# Patient Record
Sex: FEMALE | Race: OTHER | HISPANIC OR LATINO | Employment: OTHER | URBAN - METROPOLITAN AREA
[De-identification: names, ages, dates, MRNs, and addresses within clinical notes are randomized per-mention and may not be internally consistent; named-entity substitution may affect disease eponyms.]

---

## 2022-09-24 ENCOUNTER — HOSPITAL ENCOUNTER (EMERGENCY)
Facility: HOSPITAL | Age: 62
Discharge: HOME | End: 2022-09-24
Attending: EMERGENCY MEDICINE
Payer: COMMERCIAL

## 2022-09-24 VITALS
BODY MASS INDEX: 40.48 KG/M2 | TEMPERATURE: 98.7 F | WEIGHT: 220 LBS | HEIGHT: 62 IN | SYSTOLIC BLOOD PRESSURE: 146 MMHG | HEART RATE: 98 BPM | RESPIRATION RATE: 18 BRPM | DIASTOLIC BLOOD PRESSURE: 91 MMHG | OXYGEN SATURATION: 99 %
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
## 2022-09-24 DIAGNOSIS — R07.89 LEFT-SIDED CHEST WALL PAIN: ICD-10-CM

## 2022-09-24 DIAGNOSIS — M25.512 SHOULDER PAIN, LEFT: ICD-10-CM

## 2022-09-24 PROCEDURE — 99282 EMERGENCY DEPT VISIT SF MDM: CPT | Performed by: EMERGENCY MEDICINE

## 2022-09-24 PROCEDURE — 99284 EMERGENCY DEPT VISIT MOD MDM: CPT
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
## 2022-10-17 ENCOUNTER — OFFICE VISIT (OUTPATIENT)
Dept: FAMILY MEDICINE CLINIC | Facility: CLINIC | Age: 62
End: 2022-10-17
Payer: MEDICARE

## 2022-10-17 VITALS
OXYGEN SATURATION: 94 % | HEIGHT: 62 IN | BODY MASS INDEX: 42.99 KG/M2 | SYSTOLIC BLOOD PRESSURE: 132 MMHG | DIASTOLIC BLOOD PRESSURE: 80 MMHG | TEMPERATURE: 97.5 F | HEART RATE: 73 BPM | WEIGHT: 233.6 LBS

## 2022-10-17 DIAGNOSIS — Z00.00 HEALTH MAINTENANCE EXAMINATION: ICD-10-CM

## 2022-10-17 DIAGNOSIS — F41.9 ANXIETY: ICD-10-CM

## 2022-10-17 DIAGNOSIS — M17.0 PRIMARY OSTEOARTHRITIS OF BOTH KNEES: ICD-10-CM

## 2022-10-17 DIAGNOSIS — I10 PRIMARY HYPERTENSION: Primary | ICD-10-CM

## 2022-10-17 DIAGNOSIS — E78.2 MIXED HYPERLIPIDEMIA: ICD-10-CM

## 2022-10-17 DIAGNOSIS — Z12.31 ENCOUNTER FOR SCREENING MAMMOGRAM FOR MALIGNANT NEOPLASM OF BREAST: ICD-10-CM

## 2022-10-17 DIAGNOSIS — E03.9 HYPOTHYROIDISM (ACQUIRED): ICD-10-CM

## 2022-10-17 DIAGNOSIS — E66.01 OBESITY, MORBID (HCC): ICD-10-CM

## 2022-10-17 DIAGNOSIS — Z12.11 SCREENING FOR COLON CANCER: ICD-10-CM

## 2022-10-17 PROBLEM — E78.5 HYPERLIPIDEMIA: Status: ACTIVE | Noted: 2022-10-17

## 2022-10-17 PROCEDURE — 99204 OFFICE O/P NEW MOD 45 MIN: CPT | Performed by: FAMILY MEDICINE

## 2022-10-17 RX ORDER — ATENOLOL 25 MG/1
25 TABLET ORAL DAILY
COMMUNITY
Start: 2022-08-22 | End: 2022-10-17 | Stop reason: SDUPTHER

## 2022-10-17 RX ORDER — CLONAZEPAM 0.5 MG/1
0.5 TABLET ORAL
Qty: 90 TABLET | Refills: 0 | Status: SHIPPED | OUTPATIENT
Start: 2022-10-17

## 2022-10-17 RX ORDER — LEVOTHYROXINE SODIUM 112 UG/1
112 TABLET ORAL DAILY
Qty: 90 TABLET | Refills: 3 | Status: SHIPPED | OUTPATIENT
Start: 2022-10-17

## 2022-10-17 RX ORDER — ATENOLOL 25 MG/1
25 TABLET ORAL DAILY
Qty: 90 TABLET | Refills: 3 | Status: SHIPPED | OUTPATIENT
Start: 2022-10-17

## 2022-10-17 RX ORDER — SIMVASTATIN 20 MG
20 TABLET ORAL DAILY
Qty: 90 TABLET | Refills: 3 | Status: SHIPPED | OUTPATIENT
Start: 2022-10-17

## 2022-10-17 RX ORDER — LEVOTHYROXINE SODIUM 112 UG/1
112 TABLET ORAL DAILY
COMMUNITY
Start: 2022-08-26 | End: 2022-10-17 | Stop reason: SDUPTHER

## 2022-10-17 RX ORDER — SIMVASTATIN 20 MG
20 TABLET ORAL DAILY
COMMUNITY
Start: 2022-08-27 | End: 2022-10-17 | Stop reason: SDUPTHER

## 2022-10-17 RX ORDER — CLONAZEPAM 0.5 MG/1
0.5 TABLET ORAL DAILY
COMMUNITY
Start: 2022-08-27 | End: 2022-10-17 | Stop reason: SDUPTHER
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
## 2022-10-17 NOTE — PATIENT INSTRUCTIONS
Blood pressure appears to be okay  Will check chemistry, lipid profile, and thyroid  Refills given on current medications  Suggest getting the COVID vaccine  Declines flu shot  X-ray both knees  Will be seeing Gynecology  Prescription given for mammogram   She opts for Cologuard  Suggest the shingles vaccine at local drugstore as she is on Medicare  Recheck 6 months

## 2023-01-24 DIAGNOSIS — Z12.31 ENCOUNTER FOR SCREENING MAMMOGRAM FOR MALIGNANT NEOPLASM OF BREAST: ICD-10-CM

## 2023-04-03 ENCOUNTER — OFFICE VISIT (OUTPATIENT)
Dept: FAMILY MEDICINE CLINIC | Facility: CLINIC | Age: 63
End: 2023-04-03

## 2023-04-03 VITALS
SYSTOLIC BLOOD PRESSURE: 132 MMHG | BODY MASS INDEX: 43.06 KG/M2 | HEART RATE: 75 BPM | WEIGHT: 234 LBS | OXYGEN SATURATION: 92 % | DIASTOLIC BLOOD PRESSURE: 78 MMHG | TEMPERATURE: 97 F | HEIGHT: 62 IN

## 2023-04-03 DIAGNOSIS — F41.9 ANXIETY: ICD-10-CM

## 2023-04-03 DIAGNOSIS — K21.9 GASTROESOPHAGEAL REFLUX DISEASE WITHOUT ESOPHAGITIS: Primary | ICD-10-CM

## 2023-04-03 DIAGNOSIS — I10 PRIMARY HYPERTENSION: ICD-10-CM

## 2023-04-03 DIAGNOSIS — E66.01 OBESITY, MORBID (HCC): ICD-10-CM

## 2023-04-03 RX ORDER — CLONAZEPAM 0.5 MG/1
0.5 TABLET ORAL
Qty: 90 TABLET | Refills: 0 | Status: SHIPPED | OUTPATIENT
Start: 2023-04-03

## 2023-04-03 RX ORDER — FAMOTIDINE 40 MG/1
40 TABLET, FILM COATED ORAL DAILY
Qty: 90 TABLET | Refills: 3 | Status: SHIPPED | OUTPATIENT
Start: 2023-04-03

## 2023-04-03 NOTE — PATIENT INSTRUCTIONS
Suspect acid reflux causing her symptoms  Trial of famotidine 40 mg once daily to suppress acid  Okay to use 3 or 4 Tums as needed for symptoms  Continue atenolol for blood pressure  Refill given for clonazepam 0 5 mg to use at bedtime  Continue simvastatin and thyroid replacement  Return as scheduled for wellness exam and follow-up of stomach symptoms
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
## 2023-04-27 ENCOUNTER — OFFICE VISIT (OUTPATIENT)
Dept: FAMILY MEDICINE CLINIC | Facility: CLINIC | Age: 63
End: 2023-04-27

## 2023-04-27 VITALS
TEMPERATURE: 97.6 F | HEIGHT: 62 IN | HEART RATE: 61 BPM | SYSTOLIC BLOOD PRESSURE: 130 MMHG | OXYGEN SATURATION: 97 % | BODY MASS INDEX: 43.24 KG/M2 | DIASTOLIC BLOOD PRESSURE: 82 MMHG | WEIGHT: 235 LBS

## 2023-04-27 DIAGNOSIS — E66.01 OBESITY, MORBID (HCC): ICD-10-CM

## 2023-04-27 DIAGNOSIS — E78.2 MIXED HYPERLIPIDEMIA: ICD-10-CM

## 2023-04-27 DIAGNOSIS — E03.9 HYPOTHYROIDISM (ACQUIRED): ICD-10-CM

## 2023-04-27 DIAGNOSIS — K21.9 GASTROESOPHAGEAL REFLUX DISEASE WITHOUT ESOPHAGITIS: ICD-10-CM

## 2023-04-27 DIAGNOSIS — M17.0 PRIMARY OSTEOARTHRITIS OF BOTH KNEES: ICD-10-CM

## 2023-04-27 DIAGNOSIS — I10 PRIMARY HYPERTENSION: Primary | ICD-10-CM

## 2023-04-27 DIAGNOSIS — Z00.00 MEDICARE ANNUAL WELLNESS VISIT, SUBSEQUENT: ICD-10-CM

## 2023-04-27 NOTE — PATIENT INSTRUCTIONS
Medicare wellness exam is completed  Blood pressure and cholesterol are well controlled  Last lipid profile was excellent  Same dose of thyroid replacement  Probably a good candidate for a knee replacement although she will get Synvisc in the near future  Suggest trial of famotidine that she was given last visit for her stomach symptoms  Works better than aloe  Recommend getting COVID booster at local drugstore  Recheck in 6 months  Medicare Preventive Visit Patient Instructions  Thank you for completing your Welcome to Medicare Visit or Medicare Annual Wellness Visit today  Your next wellness visit will be due in one year (4/27/2024)  The screening/preventive services that you may require over the next 5-10 years are detailed below  Some tests may not apply to you based off risk factors and/or age  Screening tests ordered at today's visit but not completed yet may show as past due  Also, please note that scanned in results may not display below  Preventive Screenings:  Service Recommendations Previous Testing/Comments   Colorectal Cancer Screening  * Colonoscopy    * Fecal Occult Blood Test (FOBT)/Fecal Immunochemical Test (FIT)  * Fecal DNA/Cologuard Test  * Flexible Sigmoidoscopy Age: 39-70 years old   Colonoscopy: every 10 years (may be performed more frequently if at higher risk)  OR  FOBT/FIT: every 1 year  OR  Cologuard: every 3 years  OR  Sigmoidoscopy: every 5 years  Screening may be recommended earlier than age 39 if at higher risk for colorectal cancer  Also, an individualized decision between you and your healthcare provider will decide whether screening between the ages of 74-80 would be appropriate   Colonoscopy: Not on file  FOBT/FIT: Not on file  Cologuard: Not on file  Sigmoidoscopy: Not on file          Breast Cancer Screening Age: 36 years old  Frequency: every 1-2 years  Not required if history of left and right mastectomy Mammogram: 01/12/2023        Cervical Cancer Screening Between the ages of 21-29, pap smear recommended once every 3 years  Between the ages of 33-67, can perform pap smear with HPV co-testing every 5 years  Recommendations may differ for women with a history of total hysterectomy, cervical cancer, or abnormal pap smears in past  Pap Smear: Not on file        Hepatitis C Screening Once for adults born between 1945 and 1965  More frequently in patients at high risk for Hepatitis C Hep C Antibody: Not on file        Diabetes Screening 1-2 times per year if you're at risk for diabetes or have pre-diabetes Fasting glucose: No results in last 5 years (No results in last 5 years)  A1C: No results in last 5 years (No results in last 5 years)      Cholesterol Screening Once every 5 years if you don't have a lipid disorder  May order more often based on risk factors  Lipid panel: Not on file          Other Preventive Screenings Covered by Medicare:  1  Abdominal Aortic Aneurysm (AAA) Screening: covered once if your at risk  You're considered to be at risk if you have a family history of AAA  2  Lung Cancer Screening: covers low dose CT scan once per year if you meet all of the following conditions: (1) Age 50-69; (2) No signs or symptoms of lung cancer; (3) Current smoker or have quit smoking within the last 15 years; (4) You have a tobacco smoking history of at least 20 pack years (packs per day multiplied by number of years you smoked); (5) You get a written order from a healthcare provider  3  Glaucoma Screening: covered annually if you're considered high risk: (1) You have diabetes OR (2) Family history of glaucoma OR (3)  aged 48 and older OR (3)  American aged 72 and older  3   Osteoporosis Screening: covered every 2 years if you meet one of the following conditions: (1) You're estrogen deficient and at risk for osteoporosis based off medical history and other findings; (2) Have a vertebral abnormality; (3) On glucocorticoid therapy for more than 3 months; (4) Have primary hyperparathyroidism; (5) On osteoporosis medications and need to assess response to drug therapy  · Last bone density test (DXA Scan): Not on file  5  HIV Screening: covered annually if you're between the age of 12-76  Also covered annually if you are younger than 13 and older than 72 with risk factors for HIV infection  For pregnant patients, it is covered up to 3 times per pregnancy  Immunizations:  Immunization Recommendations   Influenza Vaccine Annual influenza vaccination during flu season is recommended for all persons aged >= 6 months who do not have contraindications   Pneumococcal Vaccine   * Pneumococcal conjugate vaccine = PCV13 (Prevnar 13), PCV15 (Vaxneuvance), PCV20 (Prevnar 20)  * Pneumococcal polysaccharide vaccine = PPSV23 (Pneumovax) Adults 25-60 years old: 1-3 doses may be recommended based on certain risk factors  Adults 72 years old: 1-2 doses may be recommended based off what pneumonia vaccine you previously received   Hepatitis B Vaccine 3 dose series if at intermediate or high risk (ex: diabetes, end stage renal disease, liver disease)   Tetanus (Td) Vaccine - COST NOT COVERED BY MEDICARE PART B Following completion of primary series, a booster dose should be given every 10 years to maintain immunity against tetanus  Td may also be given as tetanus wound prophylaxis  Tdap Vaccine - COST NOT COVERED BY MEDICARE PART B Recommended at least once for all adults  For pregnant patients, recommended with each pregnancy     Shingles Vaccine (Shingrix) - COST NOT COVERED BY MEDICARE PART B  2 shot series recommended in those aged 48 and above     Health Maintenance Due:      Topic Date Due   • Hepatitis C Screening  Never done   • HIV Screening  Never done   • Cervical Cancer Screening  Never done   • Colorectal Cancer Screening  Never done   • Breast Cancer Screening: Mammogram  01/12/2024     Immunizations Due:      Topic Date Due   • COVID-19 Vaccine (3 - Booster for Moderna series) 06/04/2021     Advance Directives   What are advance directives? Advance directives are legal documents that state your wishes and plans for medical care  These plans are made ahead of time in case you lose your ability to make decisions for yourself  Advance directives can apply to any medical decision, such as the treatments you want, and if you want to donate organs  What are the types of advance directives? There are many types of advance directives, and each state has rules about how to use them  You may choose a combination of any of the following:  · Living will: This is a written record of the treatment you want  You can also choose which treatments you do not want, which to limit, and which to stop at a certain time  This includes surgery, medicine, IV fluid, and tube feedings  · Durable power of  for healthcare [facility]): This is a written record that states who you want to make healthcare choices for you when you are unable to make them for yourself  This person, called a proxy, is usually a family member or a friend  You may choose more than 1 proxy  · Do not resuscitate (DNR) order:  A DNR order is used in case your heart stops beating or you stop breathing  It is a request not to have certain forms of treatment, such as CPR  A DNR order may be included in other types of advance directives  · Medical directive: This covers the care that you want if you are in a coma, near death, or unable to make decisions for yourself  You can list the treatments you want for each condition  Treatment may include pain medicine, surgery, blood transfusions, dialysis, IV or tube feedings, and a ventilator (breathing machine)  · Values history: This document has questions about your views, beliefs, and how you feel and think about life  This information can help others choose the care that you would choose  Why are advance directives important?   An advance directive helps you control your care  Although spoken wishes may be used, it is better to have your wishes written down  Spoken wishes can be misunderstood, or not followed  Treatments may be given even if you do not want them  An advance directive may make it easier for your family to make difficult choices about your care  Weight Management   Why it is important to manage your weight:  Being overweight increases your risk of health conditions such as heart disease, high blood pressure, type 2 diabetes, and certain types of cancer  It can also increase your risk for osteoarthritis, sleep apnea, and other respiratory problems  Aim for a slow, steady weight loss  Even a small amount of weight loss can lower your risk of health problems  How to lose weight safely:  A safe and healthy way to lose weight is to eat fewer calories and get regular exercise  You can lose up about 1 pound a week by decreasing the number of calories you eat by 500 calories each day  Healthy meal plan for weight management:  A healthy meal plan includes a variety of foods, contains fewer calories, and helps you stay healthy  A healthy meal plan includes the following:  · Eat whole-grain foods more often  A healthy meal plan should contain fiber  Fiber is the part of grains, fruits, and vegetables that is not broken down by your body  Whole-grain foods are healthy and provide extra fiber in your diet  Some examples of whole-grain foods are whole-wheat breads and pastas, oatmeal, brown rice, and bulgur  · Eat a variety of vegetables every day  Include dark, leafy greens such as spinach, kale, [place] greens, and mustard greens  Eat yellow and orange vegetables such as carrots, sweet potatoes, and winter squash  · Eat a variety of fruits every day  Choose fresh or canned fruit (canned in its own juice or light syrup) instead of juice  Fruit juice has very little or no fiber  · Eat low-fat dairy foods  Drink fat-free (skim) milk or 1% milk   Eat fat-free yogurt and low-fat cottage cheese  Try low-fat cheeses such as mozzarella and other reduced-fat cheeses  · Choose meat and other protein foods that are low in fat  Choose beans or other legumes such as split peas or lentils  Choose fish, skinless poultry (chicken or turkey), or lean cuts of red meat (beef or pork)  Before you cook meat or poultry, cut off any visible fat  · Use less fat and oil  Try baking foods instead of frying them  Add less fat, such as margarine, sour cream, regular salad dressing and mayonnaise to foods  Eat fewer high-fat foods  Some examples of high-fat foods include french fries, doughnuts, ice cream, and cakes  · Eat fewer sweets  Limit foods and drinks that are high in sugar  This includes candy, cookies, regular soda, and sweetened drinks  Exercise:  Exercise at least 30 minutes per day on most days of the week  Some examples of exercise include walking, biking, dancing, and swimming  You can also fit in more physical activity by taking the stairs instead of the elevator or parking farther away from stores  Ask your healthcare provider about the best exercise plan for you  © Copyright [employer] 2018 Information is for End User's use only and may not be sold, redistributed or otherwise used for commercial purposes   All illustrations and images included in CareNotes® are the copyrighted property of A D A M , Inc  or [address] [place] [address] [employer]
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
## 2023-07-03 ENCOUNTER — OFFICE VISIT (OUTPATIENT)
Dept: FAMILY MEDICINE CLINIC | Facility: CLINIC | Age: 63
End: 2023-07-03
Payer: MEDICARE

## 2023-07-03 VITALS
HEIGHT: 62 IN | TEMPERATURE: 97.1 F | BODY MASS INDEX: 43.58 KG/M2 | HEART RATE: 74 BPM | WEIGHT: 236.8 LBS | OXYGEN SATURATION: 99 % | SYSTOLIC BLOOD PRESSURE: 130 MMHG | DIASTOLIC BLOOD PRESSURE: 82 MMHG

## 2023-07-03 DIAGNOSIS — M54.50 CHRONIC MIDLINE LOW BACK PAIN WITHOUT SCIATICA: ICD-10-CM

## 2023-07-03 DIAGNOSIS — G89.29 CHRONIC MIDLINE LOW BACK PAIN WITHOUT SCIATICA: ICD-10-CM

## 2023-07-03 DIAGNOSIS — N30.00 ACUTE CYSTITIS WITHOUT HEMATURIA: Primary | ICD-10-CM

## 2023-07-03 LAB
SL AMB  POCT GLUCOSE, UA: NEGATIVE
SL AMB LEUKOCYTE ESTERASE,UA: ABNORMAL
SL AMB POCT BILIRUBIN,UA: NEGATIVE
SL AMB POCT BLOOD,UA: ABNORMAL
SL AMB POCT CLARITY,UA: ABNORMAL
SL AMB POCT COLOR,UA: ABNORMAL
SL AMB POCT KETONES,UA: NEGATIVE
SL AMB POCT NITRITE,UA: NEGATIVE
SL AMB POCT PH,UA: 5
SL AMB POCT SPECIFIC GRAVITY,UA: 1
SL AMB POCT URINE PROTEIN: ABNORMAL
SL AMB POCT UROBILINOGEN: 0.2

## 2023-07-03 PROCEDURE — 87086 URINE CULTURE/COLONY COUNT: CPT | Performed by: FAMILY MEDICINE

## 2023-07-03 PROCEDURE — 99213 OFFICE O/P EST LOW 20 MIN: CPT | Performed by: FAMILY MEDICINE

## 2023-07-03 PROCEDURE — 81002 URINALYSIS NONAUTO W/O SCOPE: CPT | Performed by: FAMILY MEDICINE

## 2023-07-03 RX ORDER — SULFAMETHOXAZOLE AND TRIMETHOPRIM 800; 160 MG/1; MG/1
1 TABLET ORAL EVERY 12 HOURS SCHEDULED
Qty: 6 TABLET | Refills: 0 | Status: SHIPPED | OUTPATIENT
Start: 2023-07-03 | End: 2023-07-06

## 2023-07-03 NOTE — PATIENT INSTRUCTIONS
Treat with Bactrim DS twice daily for 3 days. Will obtain urine culture because of a history of having had a UTI a year ago and the fact that her present dipstick test shows trace pus cells although moderate blood. Suggest ibuprofen up to 4 capsules or tablets 3 times a day as needed for back pain. Return as scheduled.
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
## 2023-07-04 LAB — BACTERIA UR CULT: NORMAL

## 2023-08-03 ENCOUNTER — OFFICE VISIT (OUTPATIENT)
Dept: FAMILY MEDICINE CLINIC | Facility: CLINIC | Age: 63
End: 2023-08-03
Payer: MEDICARE

## 2023-08-03 VITALS
HEIGHT: 62 IN | TEMPERATURE: 97.8 F | WEIGHT: 242.6 LBS | OXYGEN SATURATION: 99 % | BODY MASS INDEX: 44.64 KG/M2 | SYSTOLIC BLOOD PRESSURE: 124 MMHG | DIASTOLIC BLOOD PRESSURE: 80 MMHG | RESPIRATION RATE: 16 BRPM | HEART RATE: 68 BPM

## 2023-08-03 DIAGNOSIS — M17.0 PRIMARY OSTEOARTHRITIS OF BOTH KNEES: ICD-10-CM

## 2023-08-03 DIAGNOSIS — R39.9 UTI SYMPTOMS: ICD-10-CM

## 2023-08-03 DIAGNOSIS — E66.01 OBESITY, MORBID (HCC): ICD-10-CM

## 2023-08-03 DIAGNOSIS — M47.816 ARTHRITIS OF LUMBAR SPINE: Primary | ICD-10-CM

## 2023-08-03 LAB
SL AMB  POCT GLUCOSE, UA: NORMAL
SL AMB LEUKOCYTE ESTERASE,UA: NORMAL
SL AMB POCT BILIRUBIN,UA: NORMAL
SL AMB POCT BLOOD,UA: NORMAL
SL AMB POCT CLARITY,UA: CLEAR
SL AMB POCT COLOR,UA: YELLOW
SL AMB POCT KETONES,UA: NORMAL
SL AMB POCT NITRITE,UA: NORMAL
SL AMB POCT PH,UA: 6
SL AMB POCT SPECIFIC GRAVITY,UA: 1.02
SL AMB POCT URINE PROTEIN: NORMAL
SL AMB POCT UROBILINOGEN: NORMAL

## 2023-08-03 PROCEDURE — 81002 URINALYSIS NONAUTO W/O SCOPE: CPT | Performed by: FAMILY MEDICINE

## 2023-08-03 PROCEDURE — 99214 OFFICE O/P EST MOD 30 MIN: CPT | Performed by: FAMILY MEDICINE

## 2023-08-03 RX ORDER — MELOXICAM 15 MG/1
15 TABLET ORAL DAILY
Qty: 90 TABLET | Refills: 3 | Status: SHIPPED | OUTPATIENT
Start: 2023-08-03

## 2023-08-03 RX ORDER — PHENTERMINE HYDROCHLORIDE 37.5 MG/1
37.5 TABLET ORAL DAILY
Qty: 30 TABLET | Refills: 0 | Status: SHIPPED | OUTPATIENT
Start: 2023-08-03
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
## 2023-08-03 NOTE — PATIENT INSTRUCTIONS
Discomfort coming from her back. Bladder is okay. Trial of meloxicam 15 mg once daily along with 2 extra strength Tylenol 3 times a day. X-ray lumbar spine. Has gained 22 pounds in the last year. Also would benefit from knee replacements. Weight loss would help. Trial of phentermine 37.5 mg once daily in the morning. Recheck in 1 month.

## 2023-08-28 DIAGNOSIS — F41.9 ANXIETY: ICD-10-CM

## 2023-08-28 RX ORDER — CLONAZEPAM 0.5 MG/1
0.5 TABLET ORAL
Qty: 90 TABLET | Refills: 0 | Status: SHIPPED | OUTPATIENT
Start: 2023-08-28

## 2023-09-19 ENCOUNTER — TELEMEDICINE (OUTPATIENT)
Dept: FAMILY MEDICINE CLINIC | Facility: CLINIC | Age: 63
End: 2023-09-19
Payer: MEDICARE

## 2023-09-19 VITALS — HEIGHT: 62 IN | TEMPERATURE: 98.4 F | BODY MASS INDEX: 44.53 KG/M2 | WEIGHT: 242 LBS

## 2023-09-19 DIAGNOSIS — U07.1 COVID-19 VIRUS INFECTION: Primary | ICD-10-CM

## 2023-09-19 PROCEDURE — 99213 OFFICE O/P EST LOW 20 MIN: CPT | Performed by: FAMILY MEDICINE

## 2023-09-19 RX ORDER — ONDANSETRON 4 MG/1
4 TABLET, FILM COATED ORAL EVERY 6 HOURS PRN
Qty: 20 TABLET | Refills: 1 | Status: SHIPPED | OUTPATIENT
Start: 2023-09-19

## 2023-09-19 RX ORDER — NIRMATRELVIR AND RITONAVIR 300-100 MG
3 KIT ORAL 2 TIMES DAILY
Qty: 30 TABLET | Refills: 0 | Status: SHIPPED | OUTPATIENT
Start: 2023-09-19 | End: 2023-09-24

## 2023-09-19 NOTE — PROGRESS NOTES
COVID-19 Outpatient Progress Note    Assessment/Plan:    Problem List Items Addressed This Visit    None  Visit Diagnoses     COVID-19 virus infection    -  Primary         Disposition:     Paxlovid 3 tablets twice daily for 5 days. Do not take simvastatin while on Paxlovid. Suggest 2 extra strength Tylenol 3 times a day. Ondansetron 4 mg every 6 hours if needed for nausea. Should be quarantine for total of 5 days. Today would be day 2. After the first 5 days, should wear a mask when she goes out for the next 5 days. Follow-up as needed. I have spent a total time of 8 minutes on the day of the encounter for this patient including       Encounter provider: Luke Malcolm MD     Provider located at: [address] [place] [id number]     Recent Visits  No visits were found meeting these conditions. Showing recent visits within past 7 days and meeting all other requirements  Today's Visits  Date Type Provider Dept   09/19/23 Telemedicine Luke Malcolm MD [facility] today's visits and meeting all other requirements  Future Appointments  No visits were found meeting these conditions. Showing future appointments within next 150 days and meeting all other requirements     This virtual check-in was done via [facility] and patient was informed that this is a secure, HIPAA-compliant platform. She agrees to proceed. Patient agrees to participate in a virtual check in via telephone or video visit instead of presenting to the office to address urgent/immediate medical needs. Patient is aware this is a billable service. She acknowledged consent and understanding of privacy and security of the video platform. The patient has agreed to participate and understands they can discontinue the visit at any time. After connecting through [facility], the patient was identified by name and date of birth.  Eric Bledsoe was informed that this was a telemedicine visit and that the exam was being conducted confidentially over secure lines. My office door was closed. No one else was in the room. Onur Varghese acknowledged consent and understanding of privacy and security of the telemedicine visit. I informed the patient that I have reviewed her record in Epic and presented the opportunity for her to ask any questions regarding the visit today. The patient agreed to participate. Verification of patient location:  Patient is located in the following state in which I hold an active license: [place]    Subjective:   Onur Varghese is a 57 y.o. female who is concerned about COVID-19. Patient's symptoms include nasal congestion, cough, nausea, vomiting and headache. Patient denies fever and shortness of breath. - Date of symptom onset: 9/18/2023      COVID-19 vaccination status: Fully vaccinated (primary series)    Tested positive today. 20-year-old mother also positive. No results found for: "SARSCOV2", "[facility]", "[address] [place] [address],[address]", "CORONAVIRUSR", "[address] [place] [address]", "[address]"    Review of Systems   Constitutional: Negative for fever. HENT: Positive for congestion. Respiratory: Positive for cough. Negative for shortness of breath. Gastrointestinal: Positive for nausea and vomiting. Neurological: Positive for headaches.      Current Outpatient Medications on File Prior to Visit   Medication Sig   • atenolol (TENORMIN) 25 mg tablet Take 1 tablet (25 mg total) by mouth daily   • clonazePAM (KlonoPIN) 0.5 mg tablet TAKE 1 TABLET (0.5 MG TOTAL) BY MOUTH DAILY AT BEDTIME AS NEEDED (SLEEP)   • famotidine (PEPCID) 40 MG tablet Take 1 tablet (40 mg total) by mouth daily   • levothyroxine 112 mcg tablet Take 1 tablet (112 mcg total) by mouth daily   • meloxicam (MOBIC) 15 mg tablet Take 1 tablet (15 mg total) by mouth daily   • phentermine (ADIPEX-P) 37.5 MG tablet Take 1 tablet (37.5 mg total) by mouth daily   • simvastatin (ZOCOR) 20 mg tablet Take 1 tablet (20 mg total) by mouth daily       Objective:    Temp 98.4 °F (36.9 °C) (Temporal)   Ht 5' 2" (1.575 m)   Wt 110 kg (242 lb)   BMI 44.26 kg/m²        Physical Exam   Alert. No cough or shortness of breath noted.   Sumanth Connors MD

## 2023-09-19 NOTE — PATIENT INSTRUCTIONS
Paxlovid 3 tablets twice daily for 5 days. Do not take simvastatin for the 5 days that you take Paxlovid. Suggest 2 extra strength Tylenol 3 times a day. Ondansetron 4 mg every 6 hours if needed for nausea. Should be quarantine for total of 5 days. Today would be day 2. After the first 5 days, should wear a mask when she goes out for the next 5 days. Follow-up as needed.

## 2023-10-18 ENCOUNTER — RA CDI HCC (OUTPATIENT)
Dept: OTHER | Facility: HOSPITAL | Age: 63
End: 2023-10-18

## 2023-11-10 DIAGNOSIS — I10 PRIMARY HYPERTENSION: ICD-10-CM

## 2023-11-10 RX ORDER — ATENOLOL 25 MG/1
25 TABLET ORAL DAILY
Qty: 90 TABLET | Refills: 3 | Status: SHIPPED | OUTPATIENT
Start: 2023-11-10

## 2023-12-03 DIAGNOSIS — E03.9 HYPOTHYROIDISM (ACQUIRED): ICD-10-CM

## 2023-12-04 RX ORDER — LEVOTHYROXINE SODIUM 112 UG/1
112 TABLET ORAL DAILY
Qty: 90 TABLET | Refills: 3 | Status: SHIPPED | OUTPATIENT
Start: 2023-12-04

## 2024-03-11 DIAGNOSIS — F41.9 ANXIETY: ICD-10-CM

## 2024-03-11 NOTE — TELEPHONE ENCOUNTER
Reason for call:   [x] Refill   [] Prior Auth  [] Other:     Office:   [x] PCP/Provider - [facility]  [] Specialty/Provider -     Medication:     Pharmacy:     Does the patient have enough for 3 days?   [x] Yes   [] No - Send as HP to POD

## 2024-03-12 DIAGNOSIS — E78.2 MIXED HYPERLIPIDEMIA: ICD-10-CM

## 2024-03-12 RX ORDER — SIMVASTATIN 20 MG
20 TABLET ORAL DAILY
Qty: 90 TABLET | Refills: 1 | Status: SHIPPED | OUTPATIENT
Start: 2024-03-12

## 2024-03-12 RX ORDER — CLONAZEPAM 0.5 MG/1
0.5 TABLET ORAL
Qty: 90 TABLET | Refills: 0 | Status: SHIPPED | OUTPATIENT
Start: 2024-03-12

## 2024-04-16 ENCOUNTER — TELEMEDICINE (OUTPATIENT)
Dept: FAMILY MEDICINE CLINIC | Facility: CLINIC | Age: 64
End: 2024-04-16
Payer: MEDICARE

## 2024-04-16 ENCOUNTER — TELEPHONE (OUTPATIENT)
Age: 64
End: 2024-04-16

## 2024-04-16 VITALS — WEIGHT: 230 LBS | OXYGEN SATURATION: 98 % | HEIGHT: 62 IN | BODY MASS INDEX: 42.33 KG/M2

## 2024-04-16 DIAGNOSIS — B34.9 VIRAL SYNDROME: Primary | ICD-10-CM

## 2024-04-16 PROCEDURE — G2211 COMPLEX E/M VISIT ADD ON: HCPCS | Performed by: FAMILY MEDICINE

## 2024-04-16 PROCEDURE — 99213 OFFICE O/P EST LOW 20 MIN: CPT | Performed by: FAMILY MEDICINE

## 2024-04-16 RX ORDER — BENZONATATE 200 MG/1
200 CAPSULE ORAL 3 TIMES DAILY PRN
Qty: 30 CAPSULE | Refills: 1 | Status: SHIPPED | OUTPATIENT
Start: 2024-04-16

## 2024-04-16 NOTE — TELEPHONE ENCOUNTER
Patient called in inquiring about her virtual visit. Patient want to speak with Rashid. Warm transferred to Rashid.

## 2024-04-16 NOTE — PROGRESS NOTES
Virtual Regular Visit    Verification of patient location:    Patient is located at Home in the following state in which I hold an active license [place]      Assessment/Plan:    Problem List Items Addressed This Visit    None  Visit Diagnoses       Viral syndrome    -  Primary    Relevant Medications    benzonatate (TESSALON) 200 MG capsule          Patient Instructions   Continue Tylenol 3 times a day.  Honey is good for cough.  Cough drops are helpful.  Benzonatate 200 mg 3 times a day for nonnarcotic cough relief.  Antibiotic would not be helpful.  Could use Sudafed if needed for runny nose.  Claritin is good for allergies.           Reason for visit is   Chief Complaint   Patient presents with    URI     Dry cough, congestion started last week    Virtual Regular Visit          Encounter provider Oscar Cooley MD    Provider located at [address] [place] [id number]      Recent Visits  No visits were found meeting these conditions.  Showing recent visits within past 7 days and meeting all other requirements  Today's Visits  Date Type Provider Dept   04/16/24 Telemedicine Oscar Cooley MD [facility]   Showing today's visits and meeting all other requirements  Future Appointments  No visits were found meeting these conditions.  Showing future appointments within next 150 days and meeting all other requirements       The patient was identified by name and date of birth. Susanwalter Turnerado was informed that this is a telemedicine visit and that the visit is being conducted through the Epic Embedded platform. She agrees to proceed..  My office door was closed. No one else was in the room.  She acknowledged consent and understanding of privacy and security of the video platform. The patient has agreed to participate and understands they can discontinue the visit at any time.    Patient is aware this is a billable service.     Subjective  Susan Oliva  is a 63 y.o. female who started with a scratchy throat about 10 days ago.  Then developed cough and congestion.  Today, had some sneezing.  No fever.  Taking Tylenol.  Drinking tea with honey and lemon.  Has a little bit of phlegm.  Cough is bothersome at night.  .           Past Medical History:   Diagnosis Date    Anxiety     Arthritis of lumbar spine 8/3/2023    Gastroesophageal reflux disease without esophagitis 4/3/2023    Hyperlipidemia     Hypertension     Hypothyroidism (acquired)     Osteoarthritis of both knees 10/17/2022       Past Surgical History:   Procedure Laterality Date    CHOLECYSTECTOMY  1986    open    TONSILLECTOMY      TUBAL LIGATION         Current Outpatient Medications   Medication Sig Dispense Refill    atenolol (TENORMIN) 25 mg tablet TAKE 1 TABLET (25 MG TOTAL) BY MOUTH DAILY. 90 tablet 3    clonazePAM (KlonoPIN) 0.5 mg tablet Take 1 tablet (0.5 mg total) by mouth daily at bedtime as needed (Sleep) 90 tablet 0    levothyroxine 112 mcg tablet TAKE 1 TABLET BY MOUTH EVERY DAY 90 tablet 3    simvastatin (ZOCOR) 20 mg tablet TAKE 1 TABLET BY MOUTH EVERY DAY 90 tablet 1    famotidine (PEPCID) 40 MG tablet Take 1 tablet (40 mg total) by mouth daily (Patient not taking: Reported on 4/16/2024) 90 tablet 3    meloxicam (MOBIC) 15 mg tablet Take 1 tablet (15 mg total) by mouth daily (Patient not taking: Reported on 4/16/2024) 90 tablet 3    ondansetron (ZOFRAN) 4 mg tablet Take 1 tablet (4 mg total) by mouth every 6 (six) hours as needed for nausea or vomiting (Patient not taking: Reported on 4/16/2024) 20 tablet 1    phentermine (ADIPEX-P) 37.5 MG tablet Take 1 tablet (37.5 mg total) by mouth daily (Patient not taking: Reported on 4/16/2024) 30 tablet 0     No current facility-administered medications for this visit.        Allergies   Allergen Reactions    Prednisone Dermatitis       Red cheeks    Percocet [Oxycodone-Acetaminophen] Palpitations       Review of Systems    Video Exam    Vitals:     "04/16/24 1511   SpO2: 98%   Weight: 104 kg (230 lb)   Height: 5' 2\" (1.575 m)       Physical Exam   Breathing comfortably.  No cough noted.  Visit Time  Total Visit Duration: 6        "

## 2024-04-16 NOTE — PATIENT INSTRUCTIONS
Continue Tylenol 3 times a day.  Honey is good for cough.  Cough drops are helpful.  Benzonatate 200 mg 3 times a day for nonnarcotic cough relief.  Antibiotic would not be helpful.  Could use Sudafed if needed for runny nose.  Claritin is good for allergies.

## 2024-06-17 ENCOUNTER — RA CDI HCC (OUTPATIENT)
Dept: OTHER | Facility: HOSPITAL | Age: 64
End: 2024-06-17

## 2024-06-24 ENCOUNTER — OFFICE VISIT (OUTPATIENT)
Dept: FAMILY MEDICINE CLINIC | Facility: CLINIC | Age: 64
End: 2024-06-24
Payer: MEDICARE

## 2024-06-24 VITALS
WEIGHT: 243.6 LBS | HEART RATE: 74 BPM | TEMPERATURE: 97.6 F | BODY MASS INDEX: 44.83 KG/M2 | DIASTOLIC BLOOD PRESSURE: 76 MMHG | HEIGHT: 62 IN | SYSTOLIC BLOOD PRESSURE: 124 MMHG | OXYGEN SATURATION: 96 %

## 2024-06-24 DIAGNOSIS — Z00.00 MEDICARE ANNUAL WELLNESS VISIT, SUBSEQUENT: Primary | ICD-10-CM

## 2024-06-24 DIAGNOSIS — E03.9 HYPOTHYROIDISM (ACQUIRED): ICD-10-CM

## 2024-06-24 DIAGNOSIS — E78.2 MIXED HYPERLIPIDEMIA: ICD-10-CM

## 2024-06-24 DIAGNOSIS — E66.01 OBESITY, MORBID (HCC): ICD-10-CM

## 2024-06-24 DIAGNOSIS — R05.3 CHRONIC COUGH: ICD-10-CM

## 2024-06-24 DIAGNOSIS — K21.9 GASTROESOPHAGEAL REFLUX DISEASE WITHOUT ESOPHAGITIS: ICD-10-CM

## 2024-06-24 DIAGNOSIS — I10 PRIMARY HYPERTENSION: ICD-10-CM

## 2024-06-24 DIAGNOSIS — Z78.0 ASYMPTOMATIC MENOPAUSE: ICD-10-CM

## 2024-06-24 DIAGNOSIS — Z12.11 SCREENING FOR COLON CANCER: ICD-10-CM

## 2024-06-24 DIAGNOSIS — M17.0 PRIMARY OSTEOARTHRITIS OF BOTH KNEES: ICD-10-CM

## 2024-06-24 PROCEDURE — G0439 PPPS, SUBSEQ VISIT: HCPCS | Performed by: FAMILY MEDICINE

## 2024-06-24 PROCEDURE — 99214 OFFICE O/P EST MOD 30 MIN: CPT | Performed by: FAMILY MEDICINE
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
## 2024-06-24 NOTE — PROGRESS NOTES
Ambulatory Visit  Name: Susan Oliva      : 1960      MRN: [id number]  Encounter Provider: Oscar Cooley MD  Encounter Date: 2024   Encounter department: [facility]    Assessment & Plan   1. Medicare annual wellness visit, subsequent       Preventive health issues were discussed with patient, and age appropriate screening tests were ordered as noted in patient's After Visit Summary. Personalized health advice and appropriate referrals for health education or preventive services given if needed, as noted in patient's After Visit Summary.    History of Present Illness     HPI   Patient Care Team:  Oscar Cooley MD as PCP - General (Family Medicine)    Review of Systems  Medical History Reviewed by provider this encounter:       Annual Wellness Visit Questionnaire   Susan is here for her Subsequent Wellness visit.     Health Risk Assessment:   Patient rates overall health as good. Patient feels that their physical health rating is same. Patient is satisfied with their life. Eyesight was rated as same. Hearing was rated as same. Patient feels that their emotional and mental health rating is same. Patients states they are never, rarely angry. Patient states they are never, rarely unusually tired/fatigued. Pain experienced in the last 7 days has been a lot. Patient's pain rating has been 9/10. Patient states that she has experienced weight loss or gain in last 6 months.     Depression Screening:   PHQ-2 Score: 0      Fall Risk Screening:   In the past year, patient has experienced: no history of falling in past year      Urinary Incontinence Screening:   Patient has leaked urine accidently in the last six months.     Home Safety:  Patient has trouble with stairs inside or outside of their home. Patient has working smoke alarms and has working carbon monoxide detector. Home safety hazards include: none.     Nutrition:   Current diet is No Added Salt.     Medications:   Patient is not  currently taking any over-the-counter supplements. Patient is able to manage medications.     Activities of Daily Living (ADLs)/Instrumental Activities of Daily Living (IADLs):   Walk and transfer into and out of bed and chair?: Yes  Dress and groom yourself?: Yes    Bathe or shower yourself?: Yes    Feed yourself? Yes  Do your laundry/housekeeping?: Yes  Manage your money, pay your bills and track your expenses?: Yes  Make your own meals?: Yes    Do your own shopping?: Yes    Previous Hospitalizations:   Any hospitalizations or ED visits within the last 12 months?: No      Advance Care Planning:   Living will: No    Durable POA for healthcare: No    Advanced directive: No      PREVENTIVE SCREENINGS      Cardiovascular Screening:    General: Screening Not Indicated and History Lipid Disorder      Breast Cancer Screening:     General: Screening Current      Cervical Cancer Screening:    General: Screening Not Indicated      Lung Cancer Screening:     General: Screening Not Indicated    Screening, Brief Intervention, and Referral to Treatment (SBIRT)    Screening  Typical number of drinks in a day: 0  Typical number of drinks in a week: 0  Interpretation: Low risk drinking behavior.    Single Item Drug Screening:  How often have you used an illegal drug (including marijuana) or a prescription medication for non-medical reasons in the past year? never    Single Item Drug Screen Score: 0  Interpretation: Negative screen for possible drug use disorder    Social Determinants of Health     Financial Resource Strain: Low Risk  (4/27/2023)    Overall Financial Resource Strain (CARDIA)     Difficulty of Paying Living Expenses: Not hard at all   Transportation Needs: No Transportation Needs (4/27/2023)    PRAPARE - Transportation     Lack of Transportation (Medical): No     Lack of Transportation (Non-Medical): No     No results found.    Objective     /76 (BP Location: Left arm, Patient Position: Sitting, Cuff Size:  "Large)   Pulse 74   Temp 97.6 °F (36.4 °C) (Temporal)   Ht 5' 2\" (1.575 m)   Wt 110 kg (243 lb 9.6 oz)   SpO2 96%   BMI 44.56 kg/m²     Physical Exam  Administrative Statements           "

## 2024-06-24 NOTE — PATIENT INSTRUCTIONS
Medicare wellness exam is completed.  Cologuard for colon cancer screening.  Scheduled for mammogram next week.  Recheck blood work to include lipid profile, thyroid, blood sugar and kidney function.  Cough is most likely viral and needs to run its course.  Obtain chest x-ray.  Continue cough drops and benzonatate.  Recheck in 3 months.    Medicare Preventive Visit Patient Instructions  Thank you for completing your Welcome to Medicare Visit or Medicare Annual Wellness Visit today. Your next wellness visit will be due in one year (6/25/2025).  The screening/preventive services that you may require over the next 5-10 years are detailed below. Some tests may not apply to you based off risk factors and/or age. Screening tests ordered at today's visit but not completed yet may show as past due. Also, please note that scanned in results may not display below.  Preventive Screenings:  Service Recommendations Previous Testing/Comments   Colorectal Cancer Screening  * Colonoscopy    * Fecal Occult Blood Test (FOBT)/Fecal Immunochemical Test (FIT)  * Fecal DNA/Cologuard Test  * Flexible Sigmoidoscopy Age: 45-75 years old   Colonoscopy: every 10 years (may be performed more frequently if at higher risk)  OR  FOBT/FIT: every 1 year  OR  Cologuard: every 3 years  OR  Sigmoidoscopy: every 5 years  Screening may be recommended earlier than age 45 if at higher risk for colorectal cancer. Also, an individualized decision between you and your healthcare provider will decide whether screening between the ages of 76-85 would be appropriate. Colonoscopy: Not on file  FOBT/FIT: Not on file  Cologuard: Not on file  Sigmoidoscopy: Not on file          Breast Cancer Screening Age: 40+ years old  Frequency: every 1-2 years  Not required if history of left and right mastectomy Mammogram: 01/12/2023        Cervical Cancer Screening Between the ages of 21-29, pap smear recommended once every 3 years.   Between the ages of 30-65, can perform  pap smear with HPV co-testing every 5 years.   Recommendations may differ for women with a history of total hysterectomy, cervical cancer, or abnormal pap smears in past. Pap Smear: Not on file        Hepatitis C Screening Once for adults born between 1945 and 1965  More frequently in patients at high risk for Hepatitis C Hep C Antibody: Not on file        Diabetes Screening 1-2 times per year if you're at risk for diabetes or have pre-diabetes Fasting glucose: No results in last 5 years (No results in last 5 years)  A1C: No results in last 5 years (No results in last 5 years)      Cholesterol Screening Once every 5 years if you don't have a lipid disorder. May order more often based on risk factors. Lipid panel: Not on file          Other Preventive Screenings Covered by Medicare:  Abdominal Aortic Aneurysm (AAA) Screening: covered once if your at risk. You're considered to be at risk if you have a family history of AAA.  Lung Cancer Screening: covers low dose CT scan once per year if you meet all of the following conditions: (1) Age 55-77; (2) No signs or symptoms of lung cancer; (3) Current smoker or have quit smoking within the last 15 years; (4) You have a tobacco smoking history of at least 20 pack years (packs per day multiplied by number of years you smoked); (5) You get a written order from a healthcare provider.  Glaucoma Screening: covered annually if you're considered high risk: (1) You have diabetes OR (2) Family history of glaucoma OR (3)  aged 50 and older OR (4)  American aged 65 and older  Osteoporosis Screening: covered every 2 years if you meet one of the following conditions: (1) You're estrogen deficient and at risk for osteoporosis based off medical history and other findings; (2) Have a vertebral abnormality; (3) On glucocorticoid therapy for more than 3 months; (4) Have primary hyperparathyroidism; (5) On osteoporosis medications and need to assess response to drug  therapy.   Last bone density test (DXA Scan): Not on file.  HIV Screening: covered annually if you're between the age of 15-65. Also covered annually if you are younger than 15 and older than 65 with risk factors for HIV infection. For pregnant patients, it is covered up to 3 times per pregnancy.    Immunizations:  Immunization Recommendations   Influenza Vaccine Annual influenza vaccination during flu season is recommended for all persons aged >= 6 months who do not have contraindications   Pneumococcal Vaccine   * Pneumococcal conjugate vaccine = PCV13 (Prevnar 13), PCV15 (Vaxneuvance), PCV20 (Prevnar 20)  * Pneumococcal polysaccharide vaccine = PPSV23 (Pneumovax) Adults 19-63 yo with certain risk factors or if 65+ yo  If never received any pneumonia vaccine: recommend Prevnar 20 (PCV20)  Give PCV20 if previously received 1 dose of PCV13 or PPSV23   Hepatitis B Vaccine 3 dose series if at intermediate or high risk (ex: diabetes, end stage renal disease, liver disease)   Respiratory syncytial virus (RSV) Vaccine - COVERED BY MEDICARE PART D  * RSVPreF3 (Arexvy) CDC recommends that adults 60 years of age and older may receive a single dose of RSV vaccine using shared clinical decision-making (SCDM)   Tetanus (Td) Vaccine - COST NOT COVERED BY MEDICARE PART B Following completion of primary series, a booster dose should be given every 10 years to maintain immunity against tetanus. Td may also be given as tetanus wound prophylaxis.   Tdap Vaccine - COST NOT COVERED BY MEDICARE PART B Recommended at least once for all adults. For pregnant patients, recommended with each pregnancy.   Shingles Vaccine (Shingrix) - COST NOT COVERED BY MEDICARE PART B  2 shot series recommended in those 19 years and older who have or will have weakened immune systems or those 50 years and older     Health Maintenance Due:      Topic Date Due    Hepatitis C Screening  Never done    HIV Screening  Never done    Colorectal Cancer Screening   Never done    Breast Cancer Screening: Mammogram  01/12/2024     Immunizations Due:      Topic Date Due    COVID-19 Vaccine (3 - 2023-24 season) 09/01/2023    Pneumococcal Vaccine: 65+ Years (1 of 1 - PCV) Never done    Influenza Vaccine (Season Ended) 09/01/2024     Advance Directives   What are advance directives?  Advance directives are legal documents that state your wishes and plans for medical care. These plans are made ahead of time in case you lose your ability to make decisions for yourself. Advance directives can apply to any medical decision, such as the treatments you want, and if you want to donate organs.   What are the types of advance directives?  There are many types of advance directives, and each state has rules about how to use them. You may choose a combination of any of the following:  Living will:  This is a written record of the treatment you want. You can also choose which treatments you do not want, which to limit, and which to stop at a certain time. This includes surgery, medicine, IV fluid, and tube feedings.   Durable power of  for healthcare (DPAHC):  This is a written record that states who you want to make healthcare choices for you when you are unable to make them for yourself. This person, called a proxy, is usually a family member or a friend. You may choose more than 1 proxy.  Do not resuscitate (DNR) order:  A DNR order is used in case your heart stops beating or you stop breathing. It is a request not to have certain forms of treatment, such as CPR. A DNR order may be included in other types of advance directives.  Medical directive:  This covers the care that you want if you are in a coma, near death, or unable to make decisions for yourself. You can list the treatments you want for each condition. Treatment may include pain medicine, surgery, blood transfusions, dialysis, IV or tube feedings, and a ventilator (breathing machine).  Values history:  This document has  questions about your views, beliefs, and how you feel and think about life. This information can help others choose the care that you would choose.  Why are advance directives important?  An advance directive helps you control your care. Although spoken wishes may be used, it is better to have your wishes written down. Spoken wishes can be misunderstood, or not followed. Treatments may be given even if you do not want them. An advance directive may make it easier for your family to make difficult choices about your care.   Weight Management   Why it is important to manage your weight:  Being overweight increases your risk of health conditions such as heart disease, high blood pressure, type 2 diabetes, and certain types of cancer. It can also increase your risk for osteoarthritis, sleep apnea, and other respiratory problems. Aim for a slow, steady weight loss. Even a small amount of weight loss can lower your risk of health problems.  How to lose weight safely:  A safe and healthy way to lose weight is to eat fewer calories and get regular exercise. You can lose up about 1 pound a week by decreasing the number of calories you eat by 500 calories each day.   Healthy meal plan for weight management:  A healthy meal plan includes a variety of foods, contains fewer calories, and helps you stay healthy. A healthy meal plan includes the following:  Eat whole-grain foods more often.  A healthy meal plan should contain fiber. Fiber is the part of grains, fruits, and vegetables that is not broken down by your body. Whole-grain foods are healthy and provide extra fiber in your diet. Some examples of whole-grain foods are whole-wheat breads and pastas, oatmeal, brown rice, and bulgur.  Eat a variety of vegetables every day.  Include dark, leafy greens such as spinach, kale, sdyney greens, and mustard greens. Eat yellow and orange vegetables such as carrots, sweet potatoes, and winter squash.   Eat a variety of fruits every  day.  Choose fresh or canned fruit (canned in its own juice or light syrup) instead of juice. Fruit juice has very little or no fiber.  Eat low-fat dairy foods.  Drink fat-free (skim) milk or 1% milk. Eat fat-free yogurt and low-fat cottage cheese. Try low-fat cheeses such as mozzarella and other reduced-fat cheeses.  Choose meat and other protein foods that are low in fat.  Choose beans or other legumes such as split peas or lentils. Choose fish, skinless poultry (chicken or turkey), or lean cuts of red meat (beef or pork). Before you cook meat or poultry, cut off any visible fat.   Use less fat and oil.  Try baking foods instead of frying them. Add less fat, such as margarine, sour cream, regular salad dressing and mayonnaise to foods. Eat fewer high-fat foods. Some examples of high-fat foods include french fries, doughnuts, ice cream, and cakes.  Eat fewer sweets.  Limit foods and drinks that are high in sugar. This includes candy, cookies, regular soda, and sweetened drinks.  Exercise:  Exercise at least 30 minutes per day on most days of the week. Some examples of exercise include walking, biking, dancing, and swimming. You can also fit in more physical activity by taking the stairs instead of the elevator or parking farther away from stores. Ask your healthcare provider about the best exercise plan for you.      © Copyright [employer] 2018 Information is for End User's use only and may not be sold, redistributed or otherwise used for commercial purposes. All illustrations and images included in CareNotes® are the copyrighted property of [employer]., Inc. or [employer]

## 2024-07-08 LAB
ANION GAP SERPL CALCULATED.3IONS-SCNC: 11 MMOL/L (ref 3–11)
BUN SERPL-MCNC: 18 MG/DL (ref 7–25)
CALCIUM SERPL-MCNC: 9.6 MG/DL (ref 8.5–10.1)
CHLORIDE SERPL-SCNC: 106 MMOL/L (ref 100–109)
CHOLEST SERPL-MCNC: 174 MG/DL
CHOLEST/HDLC SERPL: 4.2 {RATIO}
CO2 SERPL-SCNC: 24 MMOL/L (ref 21–31)
CREAT SERPL-MCNC: 0.77 MG/DL (ref 0.4–1.1)
CYTOLOGY CMNT CVX/VAG CYTO-IMP: NORMAL
GFR/BSA.PRED SERPLBLD CYS-BASED-ARV: 85 ML/MIN/{1.73_M2}
GLUCOSE SERPL-MCNC: 96 MG/DL (ref 65–99)
HDLC SERPL-MCNC: 41 MG/DL (ref 23–92)
LDLC SERPL CALC-MCNC: 108 MG/DL
NONHDLC SERPL-MCNC: 133 MG/DL
POTASSIUM SERPL-SCNC: 4.4 MMOL/L (ref 3.5–5.2)
SODIUM SERPL-SCNC: 141 MMOL/L (ref 135–145)
T4 FREE SERPL-MCNC: 1.37 NG/DL (ref 0.61–1.12)
TRIGL SERPL-MCNC: 125 MG/DL
TSH SERPL-ACNC: 4.25 UIU/ML (ref 0.45–5.33)

## 2024-07-08 NOTE — RESULT ENCOUNTER NOTE
Blood work is excellent.  Same dose of thyroid replacement.  Continue simvastatin.  Cholesterol is excellent.  Kidney function is normal.

## 2024-07-11 ENCOUNTER — TELEPHONE (OUTPATIENT)
Age: 64
End: 2024-07-11

## 2024-07-11 DIAGNOSIS — F41.9 ANXIETY: ICD-10-CM

## 2024-07-11 RX ORDER — CLONAZEPAM 0.5 MG/1
0.5 TABLET ORAL
Qty: 90 TABLET | Refills: 0 | Status: SHIPPED | OUTPATIENT
Start: 2024-07-11

## 2024-07-11 NOTE — TELEPHONE ENCOUNTER
Medication: Clonazepam    Dose/Frequency: 0.5 mg    Quantity: 90    Pharmacy: [facility] [place]     Office:   [x] PCP/Provider -   [] Speciality/Provider -     Does the patient have enough for 3 days?   [x] Yes   [] No - Send as HP to POD

## 2024-07-11 NOTE — TELEPHONE ENCOUNTER
"Pt called in for her lab and xray results. Informed patient per Dr. Cooley he states \"   Blood work is excellent.  Same dose of thyroid replacement.  Continue simvastatin.  Cholesterol is excellent.  Kidney function is normal.\"    Informed patient of the xray results stating Dr. Cooley states \" Chest x-ray is normal\".     Pt had no further questions or concerns.   "

## 2024-07-22 DIAGNOSIS — E78.2 MIXED HYPERLIPIDEMIA: ICD-10-CM

## 2024-07-23 RX ORDER — SIMVASTATIN 20 MG
20 TABLET ORAL DAILY
Qty: 100 TABLET | Refills: 1 | Status: SHIPPED | OUTPATIENT
Start: 2024-07-23

## 2024-09-22 ENCOUNTER — TELEPHONE (OUTPATIENT)
Dept: OTHER | Facility: OTHER | Age: 64
End: 2024-09-22

## 2024-09-23 NOTE — TELEPHONE ENCOUNTER
I spoke with the patient and she isn't sure when she will be able to make it in for an appt. Requested to call back when she knows.

## 2024-09-24 ENCOUNTER — EVALUATION (OUTPATIENT)
Dept: PHYSICAL THERAPY | Facility: REHABILITATION | Age: 64
End: 2024-09-24
Payer: MEDICARE

## 2024-09-24 DIAGNOSIS — M79.671 FOOT PAIN, BILATERAL: Primary | ICD-10-CM

## 2024-09-24 DIAGNOSIS — M79.672 FOOT PAIN, BILATERAL: Primary | ICD-10-CM

## 2024-09-24 DIAGNOSIS — M77.30 HEEL SPUR, UNSPECIFIED LATERALITY: ICD-10-CM

## 2024-09-24 PROCEDURE — 97110 THERAPEUTIC EXERCISES: CPT

## 2024-09-24 PROCEDURE — 97161 PT EVAL LOW COMPLEX 20 MIN: CPT

## 2024-09-24 NOTE — PROGRESS NOTES
PT Evaluation     Today's date: 2024  Patient name: Susan Oliva  : 1960  MRN: [id number]  Referring provider: Ruben Reed DPM  Dx:   Encounter Diagnosis     ICD-10-CM    1. Foot pain, bilateral  M79.671     M79.672       2. Heel spur, unspecified laterality  M77.30           Start Time: 1205  Stop Time: 1255  Total time in clinic (min): 50 minutes    Assessment  Impairments: abnormal coordination, abnormal muscle firing, abnormal or restricted ROM, activity intolerance, impaired balance, impaired physical strength, lacks appropriate home exercise program, pain with function, weight-bearing intolerance, poor body mechanics, unable to perform ADL, participation limitations, activity limitations and endurance    Assessment details: Susan Oliva is a 63 y.o. female presenting with plantar fascitis. Primary impairments include limited ROM, strength and pain. Will benefit from skilled PT interventions for these impairments. HEP was provided and reviewed. Patient is able to complete HEP with good technique and appropriate pain response. Patient expressed understanding of appropriate dosage and frequency of HEP and was instructed to discontinue exercises if symptoms are exacerbated. No additional referral necessary. 1:1 with Tammy Varela, PT, DPT for entirety of session.     Understanding of Dx/Px/POC: good     Prognosis: good    Goals    Short Term Goals:  In 2 weeks, the patient will:  1. Decrease worst pain by 1 point on subjective scale  2. Increase strength by half a point in shoulder  3. Supervision with HEP for self care    Long Term Goals:  In 8 weeks, the patient will:  1. Decrease worst pain by 2 points while walking community distances   2. FOTO to greater than predicted value  3. Independent with HEP for selfcare        Plan  Patient would benefit from: skilled physical therapy and PT eval  Planned modality interventions: biofeedback, electrical stimulation/Russian stimulation, TENS,  neuromuscular electric stimulation and unattended electrical stimulation    Planned therapy interventions: abdominal trunk stabilization, activity modification, ADL retraining, ADL training, balance, balance/weight bearing training, behavior modification, body mechanics training, functional ROM exercises, flexibility, fine motor coordination training, gait training, graded activity, graded exercise, graded motor, home exercise program, therapeutic training, therapeutic exercise, therapeutic activities, postural training, self care, strengthening, stretching, patient/caregiver education, motor coordination training, muscle pump exercises, nerve gliding, neuromuscular re-education, manual therapy, joint mobilization and kinesiology taping    Frequency: 2x week  Duration in weeks: 8  Plan of Care beginning date: 9/24/2024  Plan of Care expiration date: 11/19/2024  Treatment plan discussed with: patient        Subjective  Diagnosis: B/l plantar fascitis  MACI: n/a   DOI: pain set on 2-3 months ago, patient just woke up with pain, and has progressively gotten a bit worse   Limitations: walking is painful immediately, standing for long periods, pt notes increased tightness walking up hill   Aggravating factors: same as limitations ^  Easing factors: elevating feet,   PSHx: patient has history of Right foot 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 Denies pain on arch of foot.   Best-  1/10, Worst- 7-8/10, Current-  2/10  PLOF: patient was more active prior but still maintains the same routines  Goals: reduce pain     Patient has a history of low back pain since 13 years old. Pt states that her doctor suspected some neurological involvement contributing to foot pain as well as a heel spur on xray. Patient also notes numbness in the anterior shin B/L.      Objective  OBJECTIVE:    Red Flags: denies trouble going to the bathroom, or sudden loss of strength     Standing Posture & Lower Extremity Alignment:  Structure/Joint         Pelvis (Tilt) x Anterior  Neutral  Posterior   Iliac Crests  Right Elevated x Neutral  Left Elevated   Knee - Frontal   Genuvalgum x Neutral  Genuvarum   Knee - Sagittal  Genurecurvatum x Neutral     Rearfoot x Valgus  Neutral  Varus   Forefoot x Abducted  Neutral     Arch x Pes Planus  Neutral  Pes Cavus       Range of Motion: Goniometric measurements revealed the following findings (in degrees).  Joint Motion Right: 9/24/2024 Left: 9/24/2024   Ankle Dorsiflexion 10 10   Ankle Plantarflexion 40 40   Ankle Supination 30 30   Ankle Pronation 15 15     Strength: MMT revealed the following findings.  Joint Motion Right: 9/24/2024 Left: 9/24/2024   Ankle Plantarflexion 3/5 3/5   Ankle Dorsiflexion 5/5 5/5   Ankle Supination 3/5 3/5   Ankle Pronation 3/5 3/5     Additional Assessments:  Palpation: slight ttp L lateral malleoli   Observation: no swelling or redness   Gait Pattern: forward trunk lean, forward head posture, anterior pelvic tilt, short step length   Balance:    SLS: R- unable d/t knee pain  , L- unable d/t knee pain     Special Tests:  Test (Structure evaluated) Date: 9/24/2024  ( +/- )   Anterior Drawer (ATF Lig.) -   Posterior Drawer (PTF Lig.) -   Talar Tilt (CF Lig.) -   Squeeze (Syndesmosis) -   Klieger (Deltoid Lig./Syndesmosis) -   Danielle (Achilles) -   Leg Length Discrepancy -   Homans Sign (DVT) -   Bansal (Neuroma) -   Slump test  -              Precautions: n/a    POC expires Unit limit Auth Expiration date PT/OT + Visit Limit?   11/19/2024 3 pending BOMN         Visit/Unit Tracking  AUTH Status:  Date 9/24        pending Used 1         Remaining             Pertinent Findings:      POC End Date: 11/19/2024                                                                                          Test / Measure  9/24    FOTO (Predicted )    Right Ankle Grossly  3/5   Ankle Dorsiflexion ROM 10 deg   SL Stance  Unable        Manuals 9/24            PROM/AP/SubT distraction Ankle                                                    Neuro Re-Ed             Ankle In/Ev TB HEP            Toe scrunches                                                                               Ther Ex             Toe Raise  HEP            Seated Calf S' HEP            Heel Raise              Ankle circles                                                                  Ther Activity             NS                          Gait Training                                       Modalities

## 2024-09-26 ENCOUNTER — APPOINTMENT (OUTPATIENT)
Dept: PHYSICAL THERAPY | Facility: REHABILITATION | Age: 64
End: 2024-09-26
Payer: MEDICARE

## 2024-10-23 ENCOUNTER — OFFICE VISIT (OUTPATIENT)
Dept: PHYSICAL THERAPY | Facility: REHABILITATION | Age: 64
End: 2024-10-23
Payer: MEDICARE

## 2024-10-23 DIAGNOSIS — M79.671 FOOT PAIN, BILATERAL: Primary | ICD-10-CM

## 2024-10-23 DIAGNOSIS — M77.30 HEEL SPUR, UNSPECIFIED LATERALITY: ICD-10-CM

## 2024-10-23 DIAGNOSIS — M79.672 FOOT PAIN, BILATERAL: Primary | ICD-10-CM

## 2024-10-23 PROCEDURE — 97110 THERAPEUTIC EXERCISES: CPT

## 2024-10-23 NOTE — PROGRESS NOTES
"Daily Note     Today's date: 10/23/2024  Patient name: Susan Oliva  : 1960  MRN: [id number]  Referring provider: Ruben Reed DPM  Dx:   Encounter Diagnosis     ICD-10-CM    1. Foot pain, bilateral  M79.671     M79.672       2. Heel spur, unspecified laterality  M77.30           Start Time: 1145  Stop Time: 1220  Total time in clinic (min): 35 minutes    Subjective: Pt notes her symptoms haven't changed since her initial evaluation, she hasn't attended since due to her mother in law passing away.       Objective: See treatment diary below      Assessment: Patient struggled most with ankle inversion and eversion and would benefit from regressing to isometrics next session. Pt tolerated current therapeutic volume and intensity with no increase in symptoms. Tolerated treatment well. Patient demonstrated fatigue post treatment and would benefit from continued PT. 1:1 with Tammy Varela DPT for entirety of session.         Plan: Continue per plan of care.      Precautions: n/a    POC expires Unit limit Auth Expiration date PT/OT + Visit Limit?   2024 3 pending BOMN         Visit/Unit Tracking  AUTH Status:  Date 9/24 10/23       pending Used 1 2        Remaining             Pertinent Findings:      POC End Date: 2024                                                                                          Test / Measure     FOTO (Predicted 53) 29   Right Ankle Grossly  3/5   Ankle Dorsiflexion ROM 10 deg   SL Stance  Unable        Manuals 9/24 10/23           PROM/AP/SubT distraction Ankle  TT 10'                                                  Neuro Re-Ed             Ankle In/Ev TB HEP Ytb 10x ea           Toe scrunches   30x ea                                                                            Ther Ex             Toe Raise  HEP 2x20           Seated Calf S' HEP 3x30\"            Heel Raise   2x10 w/ toes wedged            Ankle circles                                                  "                 Ther Activity             NS                          Gait Training                                       Modalities

## 2024-10-28 ENCOUNTER — APPOINTMENT (OUTPATIENT)
Dept: PHYSICAL THERAPY | Facility: REHABILITATION | Age: 64
End: 2024-10-28
Payer: MEDICARE

## 2024-10-31 ENCOUNTER — OFFICE VISIT (OUTPATIENT)
Dept: PHYSICAL THERAPY | Facility: REHABILITATION | Age: 64
End: 2024-10-31
Payer: MEDICARE

## 2024-10-31 DIAGNOSIS — M79.671 FOOT PAIN, BILATERAL: Primary | ICD-10-CM

## 2024-10-31 DIAGNOSIS — M79.672 FOOT PAIN, BILATERAL: Primary | ICD-10-CM

## 2024-10-31 DIAGNOSIS — M77.30 HEEL SPUR, UNSPECIFIED LATERALITY: ICD-10-CM

## 2024-10-31 PROCEDURE — 97112 NEUROMUSCULAR REEDUCATION: CPT

## 2024-10-31 PROCEDURE — 97110 THERAPEUTIC EXERCISES: CPT

## 2024-10-31 NOTE — PROGRESS NOTES
"Daily Note     Today's date: 10/31/2024  Patient name: Susan Oliva  : 1960  MRN: [id number]  Referring provider: Ruben Reed DPM  Dx:   Encounter Diagnosis     ICD-10-CM    1. Foot pain, bilateral  M79.671     M79.672       2. Heel spur, unspecified laterality  M77.30             Start Time: 1250  Stop Time: 1330  Total time in clinic (min): 40 minutes    Subjective: Pt notes her symptoms haven't changed since her initial evaluation, she hasn't attended since due to her mother in law passing away.       Objective: See treatment diary below      Assessment: Patient Noted relief with manuals and may benefit from IASTM next session focusing on the arch of the feet. Pt tolerated current therapeutic volume and intensity with no increase in symptoms. Tolerated treatment well. Patient demonstrated fatigue post treatment and would benefit from continued PT. 1:1 with Tammy Varela, DPT for 30 min and IEP for remainder.         Plan: Continue per plan of care.      Precautions: n/a    POC expires Unit limit Auth Expiration date PT/OT + Visit Limit?   2024 3 pending BOMN         Visit/Unit Tracking  AUTH Status:  Date 9/24 10/23 10/31      pending Used 1 2 3       Remaining             Pertinent Findings:      POC End Date: 2024                                                                                          Test / Measure     FOTO (Predicted 53) 29   Right Ankle Grossly  3/5   Ankle Dorsiflexion ROM 10 deg   SL Stance  Unable        Manuals 9/24 10/23 10/31          PROM/AP/SubT distraction Ankle  TT 10' TT 10'                                                 Neuro Re-Ed             Ankle In/Ev TB HEP Ytb 10x ea           Toe scrunches   30x ea 30x                                                                           Ther Ex             Toe Raise  HEP 2x20 2x15          Seated Calf S' HEP 3x30\"  3x30\"          Heel Raise   2x10 w/ toes wedge  2x10 w/ toes wedge           Ankle circles   "                                                                Ther Activity             NS   8' L3                       Gait Training                                       Modalities

## 2024-11-12 ENCOUNTER — OFFICE VISIT (OUTPATIENT)
Dept: PHYSICAL THERAPY | Facility: REHABILITATION | Age: 64
End: 2024-11-12
Payer: MEDICARE

## 2024-11-12 DIAGNOSIS — M79.672 FOOT PAIN, BILATERAL: ICD-10-CM

## 2024-11-12 DIAGNOSIS — M77.30 HEEL SPUR, UNSPECIFIED LATERALITY: Primary | ICD-10-CM

## 2024-11-12 DIAGNOSIS — M79.671 FOOT PAIN, BILATERAL: ICD-10-CM

## 2024-11-12 PROCEDURE — 97140 MANUAL THERAPY 1/> REGIONS: CPT

## 2024-11-12 PROCEDURE — 97112 NEUROMUSCULAR REEDUCATION: CPT

## 2024-11-12 NOTE — PROGRESS NOTES
"Daily Note     Today's date: 2024  Patient name: Susan Oliva  : 1960  MRN: [id number]  Referring provider: Ruben Reed DPM  Dx:   Encounter Diagnosis     ICD-10-CM    1. Heel spur, unspecified laterality  M77.30       2. Foot pain, bilateral  M79.671     M79.672               Start Time: 1050  Stop Time: 1130  Total time in clinic (min): 40 minutes    Subjective: Pt notes her symptoms are increased today and her back in particular is bothering her.      Objective: See treatment diary below      Assessment: Session modified due to increased back pain. Patient Noted relief with manuals. Pt tolerated current therapeutic volume and intensity with no increase in symptoms. Tolerated treatment well. Patient demonstrated fatigue post treatment and would benefit from continued PT. 1:1 with Tammy Varela DPT for entirety of session.           Plan: Continue per plan of care.      Precautions: n/a    POC expires Unit limit Auth Expiration date PT/OT + Visit Limit?   2024 3 pending BOMN         Visit/Unit Tracking  AUTH Status:  Date 9/24 10/23 10/31 11/12     pending Used 1 2 3 4      Remaining             Pertinent Findings:      POC End Date: 2024                                                                                          Test / Measure     FOTO (Predicted 53) 29   Right Ankle Grossly  3/5   Ankle Dorsiflexion ROM 10 deg   SL Stance  Unable        Manuals 9/24 10/23 10/31 11/12         PROM/AP/SubT distraction Ankle  TT 10' TT 10' TT 15'                                                Neuro Re-Ed             Ankle In/Ev TB HEP Ytb 10x ea  Gtb 10x ea         Toe scrunches   30x ea 30x                                                                           Ther Ex             Toe Raise  HEP 2x20 2x15 2x20          Seated Calf S' HEP 3x30\"  3x30\" 3x30\"          Heel Raise   2x10 w/ toes wedge  2x10 w/ toes wedge  2x20 5\"         Ankle circles                                      "                             Ther Activity             NS   8' L3                       Gait Training                                       Modalities

## 2024-11-13 ENCOUNTER — TELEPHONE (OUTPATIENT)
Age: 64
End: 2024-11-13

## 2024-11-13 NOTE — TELEPHONE ENCOUNTER
"Patient called, is kindly requesting a return call from Rashid.    Pt has frequent urination, lower back pain, thinks she has a UTI and is kindly requesting the \"same medication\" that the doctor prescribed the last time (unsure of name) [facility], [place].    Pt declined to schedule apt at this time.    Also, her mother has had a dry cough for several days, what does the provider recommend?  "

## 2024-11-15 ENCOUNTER — EVALUATION (OUTPATIENT)
Dept: PHYSICAL THERAPY | Facility: REHABILITATION | Age: 64
End: 2024-11-15
Payer: MEDICARE

## 2024-11-15 DIAGNOSIS — M79.671 FOOT PAIN, BILATERAL: ICD-10-CM

## 2024-11-15 DIAGNOSIS — M77.30 HEEL SPUR, UNSPECIFIED LATERALITY: Primary | ICD-10-CM

## 2024-11-15 DIAGNOSIS — M25.561 CHRONIC PAIN OF BOTH KNEES: ICD-10-CM

## 2024-11-15 DIAGNOSIS — M25.562 CHRONIC PAIN OF BOTH KNEES: ICD-10-CM

## 2024-11-15 DIAGNOSIS — M79.672 FOOT PAIN, BILATERAL: ICD-10-CM

## 2024-11-15 DIAGNOSIS — G89.29 CHRONIC PAIN OF BOTH KNEES: ICD-10-CM

## 2024-11-15 PROCEDURE — 97164 PT RE-EVAL EST PLAN CARE: CPT

## 2024-11-15 NOTE — PROGRESS NOTES
Daily Note     Today's date: 11/15/2024  Patient name: Susan Oliva  : 1960  MRN: [id number]  Referring provider: Ruben Reed DPM  Dx:   Encounter Diagnosis     ICD-10-CM    1. Heel spur, unspecified laterality  M77.30       2. Foot pain, bilateral  M79.671     M79.672       3. Chronic pain of both knees  M25.561     M25.562     G89.29                 Start Time: 1000  Stop Time: 1035  Total time in clinic (min): 35 minutes    Subjective: Pt notes her symptoms are increased today and her back in particular is bothering her. Pt notes her knee pain is getting worse.  Pain: Best-  2/10, Worst- 5/10, Current-  4/10     Objective: See treatment diary below      Assessment: Susan Oliva is a 63 y.o. female presenting for re-evaluation with plantar fascitis. Primary impairments include limited ROM, strength and pain. Will benefit from skilled PT interventions for these impairments. Pt tolerated current therapeutic volume and intensity with no increase in symptoms. Tolerated treatment well. Patient demonstrated fatigue post treatment and would benefit from continued PT. 1:1 with Tammy Varela DPT for 15 min for re-evaluation and then IEP for remainder.     Goals     Short Term Goals:  In 2 weeks, the patient will:  1. Decrease worst pain by 1 point on subjective scale MET  2. Increase strength by half a point in ankle Progressing   3. Supervision with HEP for self care MET     Long Term Goals:  In 8 weeks, the patient will:  1. Decrease worst pain by 2 points while walking community distances MET   2. FOTO to greater than predicted value MET  3. Independent with HEP for self care MET      Plan: Continue per plan of care.   Frequency: 1-2x week  Duration in weeks: 8  Plan of Care beginning date: 11/15/2024  Plan of Care expiration date: 1/10/2025  Treatment plan discussed with: patient     Precautions: n/a    POC expires Unit limit Auth Expiration date PT/OT + Visit Limit?   2024 3 pending BOMN        "  Visit/Unit Tracking  AUTH Status:  Date 9/24 10/23 10/31 11/12 11/15    pending Used 1 2 3 4 5     Remaining             Pertinent Findings:      POC End Date: 11/19/2024                                                                                          Test / Measure  9/24 11/15   FOTO (Predicted 53) 29 57   Right Ankle Grossly  3/5 3/5 R, 4/5 L    Ankle Dorsiflexion ROM 10 deg 14 deg    SL Stance  Unable  Unable        Manuals 9/24 10/23 10/31 11/12 11/15        PROM/AP/SubT distraction Ankle  TT 10' TT 10' TT 15'                                                Neuro Re-Ed             Ankle In/Ev TB HEP Ytb 10x ea  Gtb 10x ea         Toe scrunches   30x ea 30x                                                                           Ther Ex             Toe Raise  HEP 2x20 2x15 2x20  2x20         Seated Calf S' HEP 3x30\"  3x30\" 3x30\"  3x30\"         Heel Raise   2x10 w/ toes wedge  2x10 w/ toes wedge  2x20 5\" 2x20 5\"         Ankle circles                                                                  Ther Activity             NS   8' L3          Gait Training                                       Modalities                                            "

## 2024-11-15 NOTE — TELEPHONE ENCOUNTER
I called patient and spoke to her about urine symptoms and she said that she feels better. I did advise her that for any urine symptoms she would need an apt to be evaluated. She understood and agree.

## 2024-11-19 ENCOUNTER — OFFICE VISIT (OUTPATIENT)
Dept: FAMILY MEDICINE CLINIC | Facility: CLINIC | Age: 64
End: 2024-11-19
Payer: MEDICARE

## 2024-11-19 VITALS
RESPIRATION RATE: 18 BRPM | HEIGHT: 62 IN | HEART RATE: 69 BPM | OXYGEN SATURATION: 99 % | WEIGHT: 252 LBS | SYSTOLIC BLOOD PRESSURE: 136 MMHG | TEMPERATURE: 98 F | DIASTOLIC BLOOD PRESSURE: 82 MMHG | BODY MASS INDEX: 46.38 KG/M2

## 2024-11-19 DIAGNOSIS — Z12.12 SCREENING FOR COLORECTAL CANCER: ICD-10-CM

## 2024-11-19 DIAGNOSIS — Z78.0 MENOPAUSE: ICD-10-CM

## 2024-11-19 DIAGNOSIS — N32.81 OVERACTIVE BLADDER: Primary | ICD-10-CM

## 2024-11-19 DIAGNOSIS — R39.9 UTI SYMPTOMS: ICD-10-CM

## 2024-11-19 DIAGNOSIS — Z12.31 SCREENING MAMMOGRAM FOR BREAST CANCER: ICD-10-CM

## 2024-11-19 DIAGNOSIS — Z12.11 SCREENING FOR COLORECTAL CANCER: ICD-10-CM

## 2024-11-19 LAB
SL AMB  POCT GLUCOSE, UA: ABNORMAL
SL AMB LEUKOCYTE ESTERASE,UA: ABNORMAL
SL AMB POCT BILIRUBIN,UA: ABNORMAL
SL AMB POCT BLOOD,UA: ABNORMAL
SL AMB POCT CLARITY,UA: ABNORMAL
SL AMB POCT COLOR,UA: YELLOW
SL AMB POCT KETONES,UA: ABNORMAL
SL AMB POCT PH,UA: 5.5
SL AMB POCT SPECIFIC GRAVITY,UA: 1.01
SL AMB POCT URINE PROTEIN: ABNORMAL
SL AMB POCT UROBILINOGEN: ABNORMAL

## 2024-11-19 PROCEDURE — 99214 OFFICE O/P EST MOD 30 MIN: CPT | Performed by: FAMILY MEDICINE

## 2024-11-19 PROCEDURE — G2211 COMPLEX E/M VISIT ADD ON: HCPCS | Performed by: FAMILY MEDICINE

## 2024-11-19 PROCEDURE — 81002 URINALYSIS NONAUTO W/O SCOPE: CPT | Performed by: FAMILY MEDICINE

## 2024-11-19 RX ORDER — OXYBUTYNIN CHLORIDE 5 MG/1
5 TABLET ORAL
Qty: 30 TABLET | Refills: 5 | Status: SHIPPED | OUTPATIENT
Start: 2024-11-19
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
## 2024-11-19 NOTE — PATIENT INSTRUCTIONS
Advised that urine does not appear to be infected.  Has an overactive bladder.  Trial of oxybutynin 5 mg at bedtime.  Advised of possible dry mouth.  Referral for mammogram and colon cancer screening.  Also referral for bone density exam.  Recheck next June.

## 2024-11-26 ENCOUNTER — OFFICE VISIT (OUTPATIENT)
Dept: PHYSICAL THERAPY | Facility: REHABILITATION | Age: 64
End: 2024-11-26
Payer: MEDICARE

## 2024-11-26 DIAGNOSIS — M79.672 FOOT PAIN, BILATERAL: ICD-10-CM

## 2024-11-26 DIAGNOSIS — M25.561 CHRONIC PAIN OF BOTH KNEES: ICD-10-CM

## 2024-11-26 DIAGNOSIS — M77.30 HEEL SPUR, UNSPECIFIED LATERALITY: Primary | ICD-10-CM

## 2024-11-26 DIAGNOSIS — M79.671 FOOT PAIN, BILATERAL: ICD-10-CM

## 2024-11-26 DIAGNOSIS — M25.562 CHRONIC PAIN OF BOTH KNEES: ICD-10-CM

## 2024-11-26 DIAGNOSIS — G89.29 CHRONIC PAIN OF BOTH KNEES: ICD-10-CM

## 2024-11-26 PROCEDURE — 97110 THERAPEUTIC EXERCISES: CPT

## 2024-11-26 PROCEDURE — 97112 NEUROMUSCULAR REEDUCATION: CPT

## 2024-11-26 PROCEDURE — 97530 THERAPEUTIC ACTIVITIES: CPT

## 2024-11-26 NOTE — PROGRESS NOTES
Daily Note     Today's date: 2024  Patient name: Susan Oliva  : 1960  MRN: [id number]  Referring provider: Ruben Reed DPM  Dx:   Encounter Diagnosis     ICD-10-CM    1. Heel spur, unspecified laterality  M77.30       2. Foot pain, bilateral  M79.671     M79.672       3. Chronic pain of both knees  M25.561     M25.562     G89.29           Start Time: 1134  Stop Time: 1215  Total time in clinic (min): 41 minutes    Subjective: Pt reports increased pain due to much cooking for upcoming holiday.       Objective: See treatment diary below      Assessment: Tolerated treatment well. Exercises and manual performed as per POC noted below. Pt was most challenged by gastroc stretch. Pt noted decreased tension post tx. HEP and DOMS reviewed. Patient demonstrated fatigue post treatment, exhibited good technique with therapeutic exercises, and would benefit from continued PT      Plan: Continue per plan of care.  Progress treatment as tolerated.       Precautions: n/a    POC expires Unit limit Auth Expiration date PT/OT + Visit Limit?   2024 3 pending BOMN         Visit/Unit Tracking  AUTH Status:  Date 9/24 10/23 10/31 11/12 11/15 11/26   pending Used 1 2 3 4 5 6    Remaining             Pertinent Findings:      POC End Date: 2024                                                                                          Test / Measure  9/24 11/15   FOTO (Predicted 53) 29 57   Right Ankle Grossly  3/5 3/5 R, 4/5 L    Ankle Dorsiflexion ROM 10 deg 14 deg    SL Stance  Unable  Unable        Manuals 9/24 10/23 10/31 11/12 11/15 11/26       PROM/AP/SubT distraction Ankle  TT 10' TT 10' TT 15'                                                Neuro Re-Ed             Ankle In/Ev TB HEP Ytb 10x ea  Gtb 10x ea  Gtb 10x ea       Toe scrunches   30x ea 30x                                                                           Ther Ex             Toe Raise  HEP 2x20 2x15 2x20  2x20  2x20        Seated Calf S'  "HEP 3x30\"  3x30\" 3x30\"  3x30\"  3x30\"        Heel Raise   2x10 w/ toes wedge  2x10 w/ toes wedge  2x20 5\" 2x20 5\"  2x20 5\"        Ankle circles                                                                  Ther Activity             NS   8' L3   8' L4       Gait Training                                       Modalities                                              "

## 2024-12-02 DIAGNOSIS — E03.9 HYPOTHYROIDISM (ACQUIRED): ICD-10-CM

## 2024-12-03 DIAGNOSIS — I10 PRIMARY HYPERTENSION: ICD-10-CM

## 2024-12-04 RX ORDER — ATENOLOL 25 MG/1
25 TABLET ORAL DAILY
Qty: 90 TABLET | Refills: 1 | Status: SHIPPED | OUTPATIENT
Start: 2024-12-04

## 2024-12-04 RX ORDER — LEVOTHYROXINE SODIUM 112 UG/1
112 TABLET ORAL DAILY
Qty: 90 TABLET | Refills: 1 | Status: SHIPPED | OUTPATIENT
Start: 2024-12-04

## 2024-12-10 ENCOUNTER — OFFICE VISIT (OUTPATIENT)
Dept: PHYSICAL THERAPY | Facility: REHABILITATION | Age: 64
End: 2024-12-10
Payer: MEDICARE

## 2024-12-10 DIAGNOSIS — M79.672 FOOT PAIN, BILATERAL: ICD-10-CM

## 2024-12-10 DIAGNOSIS — M25.562 CHRONIC PAIN OF BOTH KNEES: Primary | ICD-10-CM

## 2024-12-10 DIAGNOSIS — M77.30 HEEL SPUR, UNSPECIFIED LATERALITY: ICD-10-CM

## 2024-12-10 DIAGNOSIS — G89.29 CHRONIC PAIN OF BOTH KNEES: Primary | ICD-10-CM

## 2024-12-10 DIAGNOSIS — M79.671 FOOT PAIN, BILATERAL: ICD-10-CM

## 2024-12-10 DIAGNOSIS — M25.561 CHRONIC PAIN OF BOTH KNEES: Primary | ICD-10-CM

## 2024-12-10 PROCEDURE — 97112 NEUROMUSCULAR REEDUCATION: CPT | Performed by: PHYSICAL THERAPIST

## 2024-12-10 PROCEDURE — 97110 THERAPEUTIC EXERCISES: CPT | Performed by: PHYSICAL THERAPIST

## 2024-12-10 NOTE — PROGRESS NOTES
"Daily Note     Today's date: 12/10/2024  Patient name: Susan Oliva  : 1960  MRN: [id number]  Referring provider: Ruben Reed DPM  Dx:   Encounter Diagnosis     ICD-10-CM    1. Chronic pain of both knees  M25.561     M25.562     G89.29       2. Foot pain, bilateral  M79.671     M79.672       3. Heel spur, unspecified laterality  M77.30                      Subjective: Overall feeling a bit better in my feet, not as much numbness.      Objective: See treatment diary below      Assessment: Tolerated treatment well. Responded well to seated nerve glides to promote neural mobility of lower quarter. Did have challenged with standing marches on foam, focusing on single limb weight shifting and lower quarter stability. Patient would benefit from continued PT      Plan: Continue per plan of care.      Precautions: n/a    POC expires Unit limit Auth Expiration date PT/OT + Visit Limit?   2024 3 pending BOMN         Visit/Unit Tracking  AUTH Status:  Date 9/24 10/23 10/31 11/12 11/15 11/26   pending Used 1 2 3 4 5 6    Remaining             Pertinent Findings:      POC End Date: 2024                                                                                          Test / Measure  9/24 11/15   FOTO (Predicted 53) 29 57   Right Ankle Grossly  3/5 3/5 R, 4/5 L    Ankle Dorsiflexion ROM 10 deg 14 deg    SL Stance  Unable  Unable        Manuals 12/10       PROM/AP/SubT distraction Ankle                                Neuro Re-Ed        Ankle In/Ev TB Gtb 10x ea       Toe scrunches         Standing Marches on Foam 2x10       Seated Sciatic Nerve Glides 2x10 B                               Ther Ex        Toe Raise  2x20        Seated Calf S' 3x30\"        Heel Raise  2x20 5\"        Ankle circles         Seated Heel Raises 2x15 15# KB each leg                               Ther Activity        NS Deferred (left hip pain)       Gait Training                        Modalities                               "

## 2024-12-12 DIAGNOSIS — N32.81 OVERACTIVE BLADDER: ICD-10-CM

## 2024-12-13 ENCOUNTER — EVALUATION (OUTPATIENT)
Dept: PHYSICAL THERAPY | Facility: REHABILITATION | Age: 64
End: 2024-12-13
Payer: MEDICARE

## 2024-12-13 DIAGNOSIS — M25.561 CHRONIC PAIN OF BOTH KNEES: Primary | ICD-10-CM

## 2024-12-13 DIAGNOSIS — M54.50 CHRONIC LOW BACK PAIN WITHOUT SCIATICA, UNSPECIFIED BACK PAIN LATERALITY: ICD-10-CM

## 2024-12-13 DIAGNOSIS — M25.562 CHRONIC PAIN OF BOTH KNEES: Primary | ICD-10-CM

## 2024-12-13 DIAGNOSIS — G89.29 CHRONIC LOW BACK PAIN WITHOUT SCIATICA, UNSPECIFIED BACK PAIN LATERALITY: ICD-10-CM

## 2024-12-13 DIAGNOSIS — M25.561 CHRONIC PAIN OF RIGHT KNEE: ICD-10-CM

## 2024-12-13 DIAGNOSIS — M25.552 LEFT HIP PAIN: ICD-10-CM

## 2024-12-13 DIAGNOSIS — G89.29 CHRONIC PAIN OF RIGHT KNEE: ICD-10-CM

## 2024-12-13 DIAGNOSIS — M79.671 FOOT PAIN, BILATERAL: ICD-10-CM

## 2024-12-13 DIAGNOSIS — M79.672 FOOT PAIN, BILATERAL: ICD-10-CM

## 2024-12-13 DIAGNOSIS — G89.29 CHRONIC PAIN OF BOTH KNEES: Primary | ICD-10-CM

## 2024-12-13 PROCEDURE — 97110 THERAPEUTIC EXERCISES: CPT

## 2024-12-13 PROCEDURE — 97140 MANUAL THERAPY 1/> REGIONS: CPT

## 2024-12-13 PROCEDURE — 97164 PT RE-EVAL EST PLAN CARE: CPT

## 2024-12-13 RX ORDER — OXYBUTYNIN CHLORIDE 5 MG/1
5 TABLET ORAL
Qty: 90 TABLET | Refills: 1 | Status: SHIPPED | OUTPATIENT
Start: 2024-12-13

## 2024-12-13 NOTE — PROGRESS NOTES
Daily Note     Today's date: 2024  Patient name: Susan Oliva  : 1960  MRN: [id number]  Referring provider: Ruben Reed DPM  Dx:   Encounter Diagnosis     ICD-10-CM    1. Chronic pain of both knees  M25.561     M25.562     G89.29       2. Foot pain, bilateral  M79.671     M79.672       3. Left hip pain  M25.552       4. Chronic low back pain without sciatica, unspecified back pain laterality  M54.50     G89.29       5. Chronic pain of right knee  M25.561     G89.29           Start Time: 1045  Stop Time: 1130  Total time in clinic (min): 45 minutes    Subjective: Overall feeling less painful but she still notes numb/tight feeling. Her chief complaint at this time is her left hip pain and right knee pain that also has been buckling about once a week.     Pain: Hip pain feels feels like she pulled a muscle and is sharp, and the knee pain is constant and can be achy and sharp at times located in the back and lateral aspect of the joint line. Best- 5/10, Worst- 8/10, Current- 7-8/10      Objective: See treatment diary below    Range of Motion: Goniometric revealed the following findings (in degrees).  Joint Motion Right: 2024 Left: 2024   Hip Flexion 90 deg  90 deg   Hip Extension Neutral  neutral    Hip External Rotation wnl Wnl    Hip Internal Rotation wfl Wfl    Knee Extension wnl wnl   Knee Flexion 80 deg  87 deg    Ankle Dorsiflexion wnl wnl   Ankle Plantarflexion wnl wnl     Strength: MMT revealed the following findings.  Joint Motion Right: 2024 Left: 2024   Hip Flexion 4/5 4/5   Hip Abduction 3+/5 33+/5   Hip Adduction 4/5 4/5   Hip Extension 3/5 3/5   Knee Extension 3+/5 3+/5   Knee Flexion 3+/5 3+/5   Ankle Plantarflexion 4/5 4/5   Ankle Dorsiflexion 4/5 4/5       Special Tests:  Test (Structure evaluated) Date: 2024  ( P / N )   Uriel (Iliapsoas/Rectus Fem) +   Greta (ITB) +   90/90 SLR (Hamstring) -   Ely (Rectus Femoris) -   Lachman (ACL) -   Posterior Drawer  (PCL) -   Sag (PCL) -   Valgus Stress (MCL) -   Varus Stress (LCL) -   Gaye (Menisci) -       Assessment:  Susan Oliva is a 63 y.o. female presenting for re-evaluation with plantar fascitis and additional left hip pain and right knee pain/weakness. Primary impairments include limited ROM, strength and pain. Will benefit from skilled PT interventions for these impairments. Pt tolerated current therapeutic volume and intensity with no increase in symptoms. Tolerated treatment well. Responded well to seated nerve glides to promote neural mobility of lower quarter. Did have challenged with standing marches on foam, focusing on single limb weight shifting and lower quarter stability. Patient would benefit from continued PT    Goals     Short Term Goals:  In 2 weeks, the patient will:  1. Decrease worst pain by 1 point on subjective scale   2. Increase strength in hip abd by half a point  3. Supervision with HEP for self care      Long Term Goals:  In 8 weeks, the patient will:  1. Decrease worst pain by 2 points while walking community distances    2. FOTO to greater than predicted value   3. Independent with HEP for self care         Plan: Continue per plan of care.   Frequency: 1-2x week  Duration in weeks: 10  Plan of Care beginning date: 12/13/2024  Plan of Care expiration date: 2/21/2025  Treatment plan discussed with: patient  Precautions: n/a    Plan: Continue per plan of care.      Precautions: n/a    POC expires Unit limit Auth Expiration date PT/OT + Visit Limit?   2/21/2025 3  BOMN         Visit/Unit Tracking  AUTH Status:  Date 9/24 10/23 10/31 11/12 11/15 11/26 12/10 12/13    Used 1 2 3 4 5 6 7 8    Remaining               Pertinent Findings:      POC End Date: 11/19/2024                                                                                          Test / Measure  9/24 11/15 12/13   FOTO (Predicted 53) 29 57 59   Right Ankle Grossly  3/5 3/5 R, 4/5 L  4/5 R, L 4/5    Ankle Dorsiflexion ROM  "10 deg 14 deg  18 deg    SL Stance  Unable  Unable         Manuals 12/10 12/13      PROM/AP/SubT distraction Ankle        Knee PROM + Mob   8' TT                      Neuro Re-Ed        Ankle In/Ev TB Gtb 10x ea nv      Side Step        Standing Marches on Foam 2x10 nv      Seated Sciatic Nerve Glides 2x10 B nv      HL Abd   Gtb 2x10 5\"       SLR        Quad Set         Ther Ex        Hamstring Stretch   10\" x5 ea      LTR   5\" x10 ea       Heel Slide  20x5\"       Heel Raise  2x20 5\"        LAQ  *      Pball Rollout   *              Ther Activity        NS Deferred (left hip pain)       Gait Training                        Modalities                                 "

## 2024-12-20 ENCOUNTER — OFFICE VISIT (OUTPATIENT)
Dept: PHYSICAL THERAPY | Facility: REHABILITATION | Age: 64
End: 2024-12-20
Payer: MEDICARE

## 2024-12-20 DIAGNOSIS — M79.672 FOOT PAIN, BILATERAL: ICD-10-CM

## 2024-12-20 DIAGNOSIS — M77.30 HEEL SPUR, UNSPECIFIED LATERALITY: ICD-10-CM

## 2024-12-20 DIAGNOSIS — M79.671 FOOT PAIN, BILATERAL: ICD-10-CM

## 2024-12-20 DIAGNOSIS — M25.561 CHRONIC PAIN OF BOTH KNEES: Primary | ICD-10-CM

## 2024-12-20 DIAGNOSIS — G89.29 CHRONIC PAIN OF BOTH KNEES: Primary | ICD-10-CM

## 2024-12-20 DIAGNOSIS — M25.561 CHRONIC PAIN OF RIGHT KNEE: ICD-10-CM

## 2024-12-20 DIAGNOSIS — G89.29 CHRONIC PAIN OF RIGHT KNEE: ICD-10-CM

## 2024-12-20 DIAGNOSIS — M25.562 CHRONIC PAIN OF BOTH KNEES: Primary | ICD-10-CM

## 2024-12-20 PROCEDURE — 97112 NEUROMUSCULAR REEDUCATION: CPT

## 2024-12-20 PROCEDURE — 97110 THERAPEUTIC EXERCISES: CPT

## 2024-12-20 NOTE — PROGRESS NOTES
"Daily Note     Today's date: 2024  Patient name: Susan Oliva  : 1960  MRN: [id number]  Referring provider: Ruben Reed DPM  Dx:   Encounter Diagnosis     ICD-10-CM    1. Chronic pain of both knees  M25.561     M25.562     G89.29       2. Heel spur, unspecified laterality  M77.30       3. Foot pain, bilateral  M79.671     M79.672       4. Chronic pain of right knee  M25.561     G89.29           Start Time: 1020  Stop Time: 1100  Total time in clinic (min): 40 minutes    Subjective: No new complaints other then increased low back pain.       Objective: See treatment diary below      Assessment: Susan notes with SAQ her right knee locks and it also locks throughout the day randomly. Tolerated treatment well. Patient demonstrated fatigue post treatment and would benefit from continued PT. 1:1 with Tammy Varela, DPT for 27min and IEP for remainder.         Plan: Continue per plan of care.      Precautions: n/a    POC expires Unit limit Auth Expiration date PT/OT + Visit Limit?   2025 3  BOMN         Visit/Unit Tracking  AUTH Status:  Date 9/24 10/23 10/31 11/12 11/15 11/26 12/10 12/13 12/20    Used 1 2 3 4 5 6 7 8 9    Remaining                Pertinent Findings:      POC End Date: 2024                                                                                          Test / Measure  9/24 11/15 12/13   FOTO (Predicted 53) 29 57 59   Right Ankle Grossly  3/5 3/5 R, 4/5 L  4/5 R, L 4/5    Ankle Dorsiflexion ROM 10 deg 14 deg  18 deg    SL Stance  Unable  Unable         Manuals 12/10 12/13 12/20     PROM/AP/SubT distraction Ankle        Knee PROM + Mob   8' TT TT 5'                     Neuro Re-Ed        Ankle In/Ev TB Gtb 10x ea nv 20x ea Gtb     Side Step        Standing Marches on Foam 2x10 nv      Seated Sciatic Nerve Glides 2x10 B nv      HL Abd   Gtb 2x10 5\"  Gtb 20x5\"      SLR negatives    2x10 w/strap      SAQ    2x10 5\" 2# ea     Quad Set         Ther Ex        Hamstring " "Stretch   10\" x5 ea      LTR   5\" x10 ea  10x5\" ea     Heel Slide  20x5\"       Heel Raise  2x20 5\"   2x10 2\"      LAQ        Pball Rollout    10x10\"              Ther Activity        NS Deferred (left hip pain)       Gait Training                        Modalities                                   "

## 2024-12-24 ENCOUNTER — APPOINTMENT (OUTPATIENT)
Dept: PHYSICAL THERAPY | Facility: REHABILITATION | Age: 64
End: 2024-12-24
Payer: MEDICARE

## 2024-12-30 ENCOUNTER — TELEMEDICINE (OUTPATIENT)
Dept: FAMILY MEDICINE CLINIC | Facility: CLINIC | Age: 64
End: 2024-12-30
Payer: MEDICARE

## 2024-12-30 DIAGNOSIS — J06.9 UPPER RESPIRATORY TRACT INFECTION, UNSPECIFIED TYPE: Primary | ICD-10-CM

## 2024-12-30 PROCEDURE — G2211 COMPLEX E/M VISIT ADD ON: HCPCS | Performed by: FAMILY MEDICINE

## 2024-12-30 PROCEDURE — 99213 OFFICE O/P EST LOW 20 MIN: CPT | Performed by: FAMILY MEDICINE

## 2024-12-30 NOTE — PROGRESS NOTES
Virtual Regular Visit  Name: Susan Oliav      : 1960      MRN: [id number]  Encounter Provider: Oscar Cooley MD  Encounter Date: 2024   Encounter department: [facility]      Verification of patient location:  Patient is located at Home in the following state in which I hold an active license [place] :  Assessment & Plan  Upper respiratory tract infection, unspecified type  Patient Instructions   Suggest 2 extra strength Tylenol and 2 Advil taken together 3 times a day for body aches, headache, and sore throat.  For congestion, pseudoephedrine from behind the counter 30 mg, 1 every 4 hours for nasal congestion.  She is concerned about possible shakiness.  An alternative would be Afrin or drugstore brand decongestant nose spray.  An antibiotic would not be helpful presently, symptoms only present for 2 days.  An antihistamine probably not helpful either.  Advised that symptoms can last 1-2 weeks.  Return as needed.               Encounter provider Oscar Cooley MD    The patient was identified by name and date of birth. Susan Oilva was informed that this is a telemedicine visit and that the visit is being conducted through the Epic Embedded platform. She agrees to proceed..  My office door was closed. No one else was in the room.  She acknowledged consent and understanding of privacy and security of the video platform. The patient has agreed to participate and understands they can discontinue the visit at any time.    Patient is aware this is a billable service.     History of Present Illness     HPI  Video visit because of symptoms include runny nose, headache, sore throat, and some discomfort in her sinuses.  Symptoms began yesterday.  Minimal cough.  No fever.  Has some bodyaches.  Has taken Tylenol.  2 extra strength every 4 hours.    Review of Systems    Objective   There were no vitals taken for this visit.    Physical Exam  Breathing comfortably.  No cough noted.  Visit  Time  Total Visit Duration: 7

## 2024-12-30 NOTE — PATIENT INSTRUCTIONS
Suggest 2 extra strength Tylenol and 2 Advil taken together 3 times a day for body aches, headache, and sore throat.  For congestion, pseudoephedrine from behind the counter 30 mg, 1 every 4 hours for nasal congestion.  She is concerned about possible shakiness.  An alternative would be Afrin or drugstore brand decongestant nose spray.  An antibiotic would not be helpful presently, symptoms only present for 2 days.  An antihistamine probably not helpful either.  Advised that symptoms can last 1-2 weeks.  Return as needed.

## 2025-01-20 DIAGNOSIS — F41.9 ANXIETY: ICD-10-CM

## 2025-01-20 NOTE — TELEPHONE ENCOUNTER
Reason for call:   [x] Refill   [] Prior Auth  [] Other:     Office:   [x] PCP/Provider -  Oscar Cooley MD   [] Specialty/Provider -     Medication:  clonazePAM (KlonoPIN) 0.5 mg tablet    Dose/Frequency: : Take 1 tablet (0.5 mg total) by mouth daily at bedtime as needed (Sleep),     Quantity: 90    Pharmacy: [facility]/pharmacy #[id number] [facility] [place] - [address]     Does the patient have enough for 3 days?   [x] Yes   [] No - Send as HP to POD

## 2025-01-21 RX ORDER — CLONAZEPAM 0.5 MG/1
0.5 TABLET ORAL
Qty: 90 TABLET | Refills: 0 | Status: SHIPPED | OUTPATIENT
Start: 2025-01-21

## 2025-01-24 ENCOUNTER — VBI (OUTPATIENT)
Dept: ADMINISTRATIVE | Facility: OTHER | Age: 65
End: 2025-01-24

## 2025-01-24 NOTE — TELEPHONE ENCOUNTER
01/24/25 2:37 PM     Chart reviewed for Mammogram was/were submitted to the patient's insurance.     Meme Adames   [facility]

## 2025-02-07 ENCOUNTER — VBI (OUTPATIENT)
Dept: ADMINISTRATIVE | Facility: OTHER | Age: 65
End: 2025-02-07

## 2025-02-07 NOTE — TELEPHONE ENCOUNTER
02/07/25 1:32 PM     Chart reviewed for CRC: Colonoscopy was/were not submitted to the patient's insurance.     Joanna Maguire MA   [facility]

## 2025-02-14 ENCOUNTER — VBI (OUTPATIENT)
Dept: ADMINISTRATIVE | Facility: OTHER | Age: 65
End: 2025-02-14

## 2025-02-14 NOTE — TELEPHONE ENCOUNTER
02/14/25 1:16 PM     Chart reviewed for Mammogram was/were submitted to the patient's insurance.     Laurita Goodman MA   [facility]

## 2025-02-18 ENCOUNTER — VBI (OUTPATIENT)
Dept: ADMINISTRATIVE | Facility: OTHER | Age: 65
End: 2025-02-18

## 2025-02-18 NOTE — TELEPHONE ENCOUNTER
02/18/25 3:43 PM     Chart reviewed for Blood Pressure was/were submitted to the patient's insurance.     Samanta Olivares MA   [facility]

## 2025-03-10 ENCOUNTER — TELEPHONE (OUTPATIENT)
Age: 65
End: 2025-03-10

## 2025-03-10 NOTE — TELEPHONE ENCOUNTER
Same drug. Different maker.  Suggest she give the new one another try.  Also, she can ask the drug store which company makes the yellow one that she liked better.

## 2025-03-10 NOTE — TELEPHONE ENCOUNTER
Patient contacted the office this morning in regards to the clonazePAM (KlonoPIN) 0.5 mg tablet. States that previously the tablet has always been yellow. The most recent time picking it up, tablet was red. She is aware of different manufactures but the red tablet she noticed makes her feel nausea. She wants to take the yellow again, unsure what she can do at this time. Please contact patient back with an update, thank you.

## 2025-03-12 NOTE — TELEPHONE ENCOUNTER
[facility] called to speak with Marta in reference to the patients medication, warm transferred to the clinical to Donna

## 2025-03-12 NOTE — TELEPHONE ENCOUNTER
Spoke with [facility] the  is [facility]. Pt picked up prescription 1/30/25 so [facility] is not able to refill this. Called and spoke with pt I discussed with her that the  was different but it is still the same pill. I informed her that Dr. Cooley would like her to try again and see how it goes.

## 2025-03-12 NOTE — TELEPHONE ENCOUNTER
I called the patient and she asked if we could call the pharmacy to see what company it is and have Dr. NICHOLSON change it?

## 2025-04-12 DIAGNOSIS — E78.2 MIXED HYPERLIPIDEMIA: ICD-10-CM

## 2025-04-14 RX ORDER — SIMVASTATIN 20 MG
20 TABLET ORAL DAILY
Qty: 100 TABLET | Refills: 1 | Status: SHIPPED | OUTPATIENT
Start: 2025-04-14

## 2025-04-17 DIAGNOSIS — F41.9 ANXIETY: ICD-10-CM

## 2025-04-17 NOTE — TELEPHONE ENCOUNTER
Reason for call:   [x] Refill   [] Prior Auth  [] Other:     Office:   [x] PCP/Provider - Oscar Cooley MD / [facility]   [] Specialty/Provider -     Medication: clonazePAM (KlonoPIN) 0.5 mg tablet      Dose/Frequency:  Take 1 tablet (0.5 mg total) by mouth daily at bedtime as needed     Quantity: 90    Pharmacy: [facility]/pharmacy #[id number] [facility] [place] - [address] [facility]     Local Pharmacy   Does the patient have enough for 3 days?   [x] Yes   [] No - Send as HP to POD    Mail Away Pharmacy   Does the patient have enough for 10 days?   [] Yes   [] No - Send as HP to POD

## 2025-04-18 RX ORDER — CLONAZEPAM 0.5 MG/1
0.5 TABLET ORAL
Qty: 90 TABLET | Refills: 0 | Status: SHIPPED | OUTPATIENT
Start: 2025-04-18

## 2025-04-30 ENCOUNTER — PROBLEM (OUTPATIENT)
Dept: URBAN - METROPOLITAN AREA CLINIC 6 | Facility: CLINIC | Age: 65
End: 2025-04-30

## 2025-04-30 DIAGNOSIS — H04.123: ICD-10-CM

## 2025-04-30 PROCEDURE — 92004 COMPRE OPH EXAM NEW PT 1/>: CPT

## 2025-04-30 ASSESSMENT — TONOMETRY
OS_IOP_MMHG: 21
OS_IOP_MMHG: 19
OD_IOP_MMHG: 21
OD_IOP_MMHG: 19

## 2025-04-30 ASSESSMENT — VISUAL ACUITY
OD_SC: 20/30
OS_SC: 20/30-1

## 2025-05-28 DIAGNOSIS — E03.9 HYPOTHYROIDISM (ACQUIRED): ICD-10-CM

## 2025-05-28 DIAGNOSIS — I10 PRIMARY HYPERTENSION: ICD-10-CM

## 2025-05-29 RX ORDER — ATENOLOL 25 MG/1
25 TABLET ORAL DAILY
Qty: 90 TABLET | Refills: 1 | Status: SHIPPED | OUTPATIENT
Start: 2025-05-29

## 2025-05-29 RX ORDER — LEVOTHYROXINE SODIUM 112 UG/1
112 TABLET ORAL DAILY
Qty: 90 TABLET | Refills: 1 | Status: SHIPPED | OUTPATIENT
Start: 2025-05-29

## 2025-06-19 ENCOUNTER — RA CDI HCC (OUTPATIENT)
Dept: OTHER | Facility: HOSPITAL | Age: 65
End: 2025-06-19

## 2025-07-31 ENCOUNTER — TELEPHONE (OUTPATIENT)
Dept: FAMILY MEDICINE CLINIC | Facility: CLINIC | Age: 65
End: 2025-07-31

## 2025-07-31 ENCOUNTER — OFFICE VISIT (OUTPATIENT)
Dept: FAMILY MEDICINE CLINIC | Facility: CLINIC | Age: 65
End: 2025-07-31
Payer: MEDICARE

## 2025-07-31 VITALS
HEART RATE: 65 BPM | SYSTOLIC BLOOD PRESSURE: 132 MMHG | WEIGHT: 259 LBS | OXYGEN SATURATION: 96 % | DIASTOLIC BLOOD PRESSURE: 78 MMHG | HEIGHT: 62 IN | BODY MASS INDEX: 47.66 KG/M2 | TEMPERATURE: 97.5 F

## 2025-07-31 DIAGNOSIS — E78.2 MIXED HYPERLIPIDEMIA: ICD-10-CM

## 2025-07-31 DIAGNOSIS — E03.9 HYPOTHYROIDISM (ACQUIRED): ICD-10-CM

## 2025-07-31 DIAGNOSIS — I10 PRIMARY HYPERTENSION: ICD-10-CM

## 2025-07-31 DIAGNOSIS — Z12.31 ENCOUNTER FOR SCREENING MAMMOGRAM FOR BREAST CANCER: ICD-10-CM

## 2025-07-31 DIAGNOSIS — B34.9 VIRAL SYNDROME: ICD-10-CM

## 2025-07-31 DIAGNOSIS — E66.01 OBESITY, MORBID (HCC): ICD-10-CM

## 2025-07-31 DIAGNOSIS — F41.9 ANXIETY: ICD-10-CM

## 2025-07-31 DIAGNOSIS — M47.816 ARTHRITIS OF LUMBAR SPINE: ICD-10-CM

## 2025-07-31 DIAGNOSIS — Z12.11 SCREENING FOR COLON CANCER: ICD-10-CM

## 2025-07-31 DIAGNOSIS — Z00.00 MEDICARE ANNUAL WELLNESS VISIT, SUBSEQUENT: Primary | ICD-10-CM

## 2025-07-31 DIAGNOSIS — K64.9 HEMORRHOIDS, UNSPECIFIED HEMORRHOID TYPE: Primary | ICD-10-CM

## 2025-07-31 PROCEDURE — G0439 PPPS, SUBSEQ VISIT: HCPCS | Performed by: FAMILY MEDICINE

## 2025-07-31 PROCEDURE — G2211 COMPLEX E/M VISIT ADD ON: HCPCS | Performed by: FAMILY MEDICINE

## 2025-07-31 PROCEDURE — 99214 OFFICE O/P EST MOD 30 MIN: CPT | Performed by: FAMILY MEDICINE

## 2025-07-31 RX ORDER — SIMVASTATIN 20 MG
20 TABLET ORAL DAILY
Qty: 100 TABLET | Refills: 3 | Status: SHIPPED | OUTPATIENT
Start: 2025-07-31

## 2025-07-31 RX ORDER — HYDROCORTISONE 25 MG/G
CREAM TOPICAL 2 TIMES DAILY
Qty: 28 G | Refills: 2 | Status: SHIPPED | OUTPATIENT
Start: 2025-07-31

## 2025-07-31 RX ORDER — CLONAZEPAM 0.5 MG/1
0.5 TABLET ORAL
Qty: 90 TABLET | Refills: 1 | Status: SHIPPED | OUTPATIENT
Start: 2025-07-31

## 2025-07-31 RX ORDER — LEVOTHYROXINE SODIUM 112 UG/1
112 TABLET ORAL DAILY
Qty: 90 TABLET | Refills: 1 | Status: SHIPPED | OUTPATIENT
Start: 2025-07-31

## 2025-07-31 RX ORDER — BENZONATATE 200 MG/1
200 CAPSULE ORAL 3 TIMES DAILY PRN
Qty: 60 CAPSULE | Refills: 5 | Status: SHIPPED | OUTPATIENT
Start: 2025-07-31

## 2025-07-31 RX ORDER — ATENOLOL 25 MG/1
25 TABLET ORAL DAILY
Qty: 90 TABLET | Refills: 3 | Status: SHIPPED | OUTPATIENT
Start: 2025-07-31

## 2025-08-14 LAB
ANION GAP SERPL CALCULATED.3IONS-SCNC: 9 MMOL/L (ref 3–11)
BUN SERPL-MCNC: 19 MG/DL (ref 7–25)
CALCIUM SERPL-MCNC: 9.2 MG/DL (ref 8.5–10.5)
CHLORIDE SERPL-SCNC: 109 MMOL/L (ref 100–109)
CHOLEST SERPL-MCNC: 149 MG/DL
CHOLEST/HDLC SERPL: 3.7 {RATIO}
CO2 SERPL-SCNC: 23 MMOL/L (ref 21–31)
CREAT SERPL-MCNC: 0.65 MG/DL (ref 0.4–1.1)
CYTOLOGY CMNT CVX/VAG CYTO-IMP: ABNORMAL
GFR/BSA.PRED SERPLBLD CYS-BASED-ARV: 96 ML/MIN/{1.73_M2}
GLUCOSE SERPL-MCNC: 111 MG/DL (ref 65–99)
HDLC SERPL-MCNC: 40 MG/DL (ref 23–92)
LDLC SERPL CALC-MCNC: 81 MG/DL
NONHDLC SERPL-MCNC: 109 MG/DL
POTASSIUM SERPL-SCNC: 4.2 MMOL/L (ref 3.5–5.2)
SODIUM SERPL-SCNC: 141 MMOL/L (ref 135–145)
T4 FREE SERPL-MCNC: 1.14 NG/DL (ref 0.61–1.12)
TRIGL SERPL-MCNC: 142 MG/DL
TSH SERPL-ACNC: 7.71 UIU/ML (ref 0.45–5.33)